# Patient Record
Sex: MALE | Race: OTHER | HISPANIC OR LATINO | Employment: FULL TIME | URBAN - METROPOLITAN AREA
[De-identification: names, ages, dates, MRNs, and addresses within clinical notes are randomized per-mention and may not be internally consistent; named-entity substitution may affect disease eponyms.]

---

## 2017-06-30 ENCOUNTER — OCCUPATIONAL MEDICINE (OUTPATIENT)
Dept: URGENT CARE | Facility: CLINIC | Age: 37
End: 2017-06-30
Payer: OTHER MISCELLANEOUS

## 2017-06-30 VITALS
OXYGEN SATURATION: 98 % | DIASTOLIC BLOOD PRESSURE: 80 MMHG | RESPIRATION RATE: 14 BRPM | TEMPERATURE: 98 F | HEART RATE: 64 BPM | SYSTOLIC BLOOD PRESSURE: 120 MMHG
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
## 2017-06-30 PROCEDURE — 90715 TDAP VACCINE 7 YRS/> IM: CPT | Performed by: FAMILY MEDICINE

## 2017-06-30 PROCEDURE — 99203 OFFICE O/P NEW LOW 30 MIN: CPT | Mod: 25 | Performed by: FAMILY MEDICINE

## 2017-06-30 PROCEDURE — 90471 IMMUNIZATION ADMIN: CPT | Performed by: FAMILY MEDICINE

## 2017-06-30 ASSESSMENT — ENCOUNTER SYMPTOMS
CARDIOVASCULAR NEGATIVE: 1
PSYCHIATRIC NEGATIVE: 1
FEVER: 0
EYES NEGATIVE: 1
CONSTITUTIONAL NEGATIVE: 1
CHILLS: 0
MUSCULOSKELETAL NEGATIVE: 1
RESPIRATORY NEGATIVE: 1
NEUROLOGICAL NEGATIVE: 1
GASTROINTESTINAL NEGATIVE: 1

## 2017-06-30 NOTE — PROGRESS NOTES
Subjective:      Rogers Cruz is a 36 y.o. male who presents with Animal Bite     HPI       DOI:  6/26     Mr. 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 He denies any fever, pain, chills, or any pain or swelling to his left lower leg or foot.  He states the dog did break his skin and there was small amount of blood at the time but nothing since.   He states he was bit by a medium sized poodle that was not a stray and is unaware if the dogs vaccines are up to date but his supervisor was investigating.    Past medical history was unremarkable and not pertinent to current issue  Social hx - denies tobacco, alcohol, drug use  Family hx was reviewed - no pertinent past family hx      Review of Systems   Constitutional: Negative.  Negative for fever, chills and malaise/fatigue.   HENT: Negative.    Eyes: Negative.    Respiratory: Negative.    Cardiovascular: Negative.  Negative for leg swelling.   Gastrointestinal: Negative.    Genitourinary: Negative.    Musculoskeletal: Negative.  Negative for joint pain.   Skin:        Small bite to left lower leg.  No bleeding, no pain, no swelling   Neurological: Negative.    Psychiatric/Behavioral: Negative.           Objective:     /80 mmHg  Pulse 64  Temp(Src) 36.7 °C (98 °F)  Resp 14  SpO2 98%     Physical Exam   Constitutional: He is oriented to person, place, and time. Vital signs are normal. He appears well-developed. He is cooperative.   Cardiovascular: Normal rate and regular rhythm.    Pulmonary/Chest: Effort normal.   Neurological: He is alert and oriented to person, place, and time.   Skin:                    Assessment/Plan:   1. Dogbite wound to back left lower leg.   Healing well  -Patient will return to full duty work today.  -TDap given.  -Will return to clinic within 7 days for follow-up.

## 2017-06-30 NOTE — Clinical Note
[address] [place] [address]  Phone: [phone] - Fax: [phone]        [facility] Progress Report and Disability Certification  Date of Service: 6/30/2017   No Show:  No  Date / Time of Next Visit:  7/6/17 @ 9:40am   Claim Information   Patient Name: Rogers Cruz  Claim Number:     Employer:  [employer]  Date of Injury: 6/26/2017     Insurer / TPA: [employer]  ID / SSN:     Occupation:   Diagnosis: The encounter diagnosis was Dog bite, initial encounter.    Medical Information   Related to Industrial Injury? 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 He denies any fever, pain, chills, or any pain or swelling to his left lower leg or foot.  He states the dog did break his skin and there was small amount of blood at the time but nothing since.   He states he was bit by a medium sized poodle that was not a stray and is unaware if the dogs vaccines are up to date but his supervisor was investigating.  He denies any fevers, chills, n/v.       LAST TETANUS - > 5 years ago   Objective Findings: Left lower leg - there is a 0.5cm, superficial healed bite wound that is clean, dry and intact.   There is no tenderness to palpation and no surrounding erythema.    Pre-Existing Condition(s):     Assessment:   Initial Visit    Status: Additional Care Required  Permanent Disability:No    Plan:      Diagnostics:      Comments:       Disability Information   Status: Released to Full Duty    From:     Through:   Restrictions are:     Physical Restrictions   Sitting:    Standing:    Stooping:    Bending:      Squatting:    Walking:    Climbing:    Pushing:      Pulling:    Other:    Reaching Above Shoulder (L):   Reaching Above Shoulder (R):       Reaching Below Shoulder (L):    Reaching Below Shoulder (R):      Not to exceed Weight  Limits   Carrying(hrs):   Weight Limit(lb):   Lifting(hrs):   Weight  Limit(lb):     Comments: Dog bite to leg  Healing well  TDaP given  Cleared for full duty  F/u 1-4 days    Repetitive Actions   Hands: i.e. Fine Manipulations from Grasping:     Feet: i.e. Operating Foot Controls:     Driving / Operate Machinery:     Physician Name: Jay Brown M.D. Physician Signature: JAY Quinteros M.D. e-Signature: Dr. Waldo Lou, Medical Director   Clinic Name / Location: [address] [facility] [place] [address] Clinic Phone Number: Dept: [phone]   Appointment Time: 10:00 Am Visit Start Time: 10:09 AM   Check-In Time:  9:59 Am Visit Discharge Time:  10:48am   Original-Treating Physician or Chiropractor    Page 2-Insurer/TPA    Page 3-Employer    Page 4-Employee

## 2017-06-30 NOTE — Clinical Note
EMPLOYEE’S CLAIM FOR COMPENSATION/ REPORT OF INITIAL TREATMENT  FORM C-4    EMPLOYEE’S CLAIM - PROVIDE ALL INFORMATION REQUESTED   First Name  Rogers Guardado Last Name  Anthony Birthdate                    1980                Sex  male Claim Number   Home Address  [address] Age  36 y.o. Height   Weight   SSN     [facility] Zip  [address] Telephone  [phone] (home)    Mailing Address  [address] [facility] Zip  [address] Primary Language Spoken  English    Insurer   Third Party   [facility]   Employee's Occupation (Job Title) When Injury or Occupational Disease Occurred      Employer's Name  [employer]  Telephone  [phone]    Employer Address  [place]   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 (Be specific an answer in detail. 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  n/a Witnesses to the Accident  n/a      Nature of Injury or Occupational Disease  Puncture  Part(s) of Body Injured or Affected  Lower Leg (L), ,     I certify that the above is true and correct to the best of my knowledge and that I have provided this information in order to obtain the benefits of [place]  inclusive or Chapter [place] of [place]).  I hereby authorize any physician, chiropractor, surgeon, practitioner, or other person, any hospital, including [facility] or [facility] hospital, any medical service organization, any insurance company, or other institution or organization to release to each other, any medical or other information, including benefits paid or payable, pertinent to this injury or disease, except information relative to diagnosis, treatment and/or counseling for AIDS, psychological conditions, alcohol or controlled substances, for which I must give specific authorization.  A Photostat of this authorization shall be as valid as the original.     Date   Place   Employee’s Signature   THIS REPORT MUST BE COMPLETED AND MAILED WITHIN 3 WORKING DAYS OF TREATMENT   Place  [facility]  Name of Facility  [facility]   Date  6/30/2017 Diagnosis  (W54.0XXA) Dog bite, initial encounter Is there evidence the injured employee was under the influence of alcohol and/or another controlled substance at the time of accident?   Hour  10:09 AM Description of Injury or Disease  The encounter diagnosis was Dog bite, initial encounter. No   Treatment  Dog bite to leg  Healing well  TDaP given  Cleared for full duty  F/u 1-4 days  Have you advised the patient to remain off work five days or more? No   X-Ray Findings      If Yes   From Date  To Date      From information given by the employee, together with medical evidence, can you directly connect this injury or occupational disease as job incurred?  Yes If No Full Duty  Yes Modified Duty      Is additional medical care by a physician indicated?  Yes If Modified Duty, Specify any Limitations / Restrictions      Do you know of any previous injury or disease contributing to this condition or occupational disease?                            No   Date  6/30/2017 Print Doctor’s Name Jay Brown M.D. I certify the employer’s copy of  this form was  "mailed on:   Address  [address] [address] Insurer’s Use Only     [facility] Zip  [address]    Provider’s Tax ID Number  [id number]  Telephone  Dept: [phone]        boogie-JORDANA Reynolds M.D.   e-Signature: Dr. Waldo Lou, Medical Director Degree  MD        ORIGINAL-TREATING PHYSICIAN OR CHIROPRACTOR    PAGE 2-INSURER/TPA    PAGE 3-EMPLOYER    PAGE 4-EMPLOYEE             Form C-4 (rev10/07)              BRIEF DESCRIPTION OF RIGHTS AND BENEFITS  (Pursuant to [place] 616C.050)    Notice of Injury or Occupational Disease (Incident Report Form C-1): If an injury or occupational disease (OD) arises out of and in the  course of employment, you must provide written notice to your employer as soon as practicable, but no later than 7 days after the accident or  OD. Your employer shall maintain a sufficient supply of the required forms.    Claim for Compensation (Form C-4): If medical treatment is sought, the form C-4 is available at the place of initial treatment. A completed  \"Claim for Compensation\" (Form C-4) must be filed within 90 days after an accident or OD. The treating physician or chiropractor must,  within 3 working days after treatment, complete and mail to the employer, the employer's insurer and third-party , the Claim for  Compensation.    Medical Treatment: If you require medical treatment for your on-the-job injury or OD, you may be required to select a physician or  chiropractor from a list provided by your workers’ compensation insurer, if it has contracted with an Organization for Managed Care (MCO) or  Preferred Provider Organization (PPO) or providers of health care. If your employer has not entered into a contract with an MCO or PPO, you  may select a physician or chiropractor from the Panel of Physicians and Chiropractors. Any medical costs related to your industrial injury or  OD will be paid by your insurer.    Temporary Total Disability (TTD): If your doctor has " certified that you are unable to work for a period of at least 5 consecutive days, or 5  cumulative days in a 20-day period, or places restrictions on you that your employer does not accommodate, you may be entitled to TTD  compensation.    Temporary Partial Disability (TPD): If the wage you receive upon reemployment is less than the compensation for TTD to which you are  entitled, the insurer may be required to pay you TPD compensation to make up the difference. TPD can only be paid for a maximum of 24  months.    Permanent Partial Disability (PPD): When your medical condition is stable and there is an indication of a PPD as a result of your injury or  OD, within 30 days, your insurer must arrange for an evaluation by a rating physician or chiropractor to determine the degree of your PPD. The  amount of your PPD award depends on the date of injury, the results of the PPD evaluation and your age and wage.    Permanent Total Disability (PTD): If you are medically certified by a treating physician or chiropractor as permanently and totally disabled  and have been granted a PTD status by your insurer, you are entitled to receive monthly benefits not to exceed 66 2/3% of your average  monthly wage. The amount of your PTD payments is subject to reduction if you previously received a PPD award.    Vocational Rehabilitation Services: You may be eligible for vocational rehabilitation services if you are unable to return to the job due to a  permanent physical impairment or permanent restrictions as a result of your injury or occupational disease.    Transportation and Per Domenica Reimbursement: You may be eligible for travel expenses and per domenica associated with medical treatment.    Reopening: You may be able to reopen your claim if your condition worsens after claim closure.    Appeal Process: If you disagree with a written determination issued by the insurer or the insurer does not respond to your request, you may  appeal  to the Department of Administration, , by following the instructions contained in your determination letter. You must  appeal the determination within 70 days from the date of the determination letter at [address], [address], [place], [place]  [address], or [address], [address], [place], [place] [address]. If you disagree with the  decision, you may appeal to the  Department of Administration, . You must file your appeal within 30 days from the date of the  decision  letter at [address], [address], [place], [place] [address], or [address], [address], [place], [place] [address]. If you  disagree with a decision of an , you may file a petition for judicial review with the District Court. You must do so within 30  days of the Appeal Officer’s decision. You may be represented by an  at your own expense or you may contact the [facility] for possible  representation.    [place]  for Injured Workers (NAIW): If you disagree with a  decision, you may request that NAIW represent you  without charge at an  Hearing. For information regarding denial of benefits, you may contact the [facility] at: [address], [address], [place] [address], [phone], or [address], [place] [address], [phone]    To File a Complaint with the Division: If you wish to file a complaint with the  of the Division of Industrial Relations (DIR),  please contact the Workers’ Compensation Section, [address], [address], [place], [place] [address], telephone [phone]) [phone], or  [address], [address], [place] [address], telephone [phone]) [phone].    For assistance with Workers’ Compensation Issues: you may contact the Office of the Governor Consumer Health Assistance, [address], [address], [place],  [place] [address], Toll Free 1-823.640.2412, Web site: http://parker.[facility].[place]., E-mail  Nathalie@[facility].nv.                                                                                                                                                                                                                                   __________________________________________________________________                                                                   _________________                Employee Name / Signature                                                                                                                                                       Date                                                                                                                                                                                                     D-2 (rev. 10/07)

## 2017-06-30 NOTE — MR AVS SNAPSHOT
Rogers Cruz   2017 10:00 AM   Occupational Medicine   MRN: [id number]    Department:  [facility]   Dept Phone:  [phone]    Description:  Male : 1980   Provider:  Jay Brown M.D.           Reason for Visit     Animal Bite NEW WC left lower leg bite X 5 days       Allergies as of 2017     No Known Allergies      You were diagnosed with     Dog bite, initial encounter   [[id number]]         Vital Signs     Blood Pressure Pulse Temperature Respirations Oxygen Saturation       120/80 mmHg 64 36.7 °C (98 °F) 14 98%       Basic Information     Date Of Birth Sex Race Ethnicity Preferred Language    1980 Male  or  Unknown English      Your appointments     2017  9:40 AM   Workers Compensation ([place]) with Gilbert Mclain D.O.   [address] [address]   [phone]              Health Maintenance        Date Due Completion Dates    IMM DTaP/Tdap/Td Vaccine (2 - Td) 2020            Current Immunizations     Tdap Vaccine 2017, 2010      Below and/or attached are the medications your provider expects you to take. Review all of your home medications and newly ordered medications with your provider and/or pharmacist. Follow medication instructions as directed by your provider and/or pharmacist. Please keep your medication list with you and share with your provider. Update the information when medications are discontinued, doses are changed, or new medications (including over-the-counter products) are added; and carry medication information at all times in the event of emergency situations     Allergies:  No Known Allergies          Medications  Valid as of: 2017 - 10:53 AM    Generic Name Brand Name Tablet Size Instructions for use    .                 Medicines prescribed today were sent to:     [id number] - [place] - [address]    [address] [place] [address]    Phone: [phone] Fax: [phone]    Open 24 Hours?: No      Medication refill instructions:       If your prescription bottle indicates you have medication refills left, it is not necessary to call your provider’s office. Please contact your pharmacy and they will refill your medication.    If your prescription bottle indicates you do not have any refills left, you may request refills at any time through one of the following ways: The online [employer] system (except Urgent Care), by calling your provider’s office, or by asking your pharmacy to contact your provider’s office with a refill request. Medication refills are processed only during regular business hours and may not be available until the next business day. Your provider may request additional information or to have a follow-up visit with you prior to refilling your medication.   *Please Note: Medication refills are assigned a new Rx number when refilled electronically. Your pharmacy may indicate that no refills were authorized even though a new prescription for the same medication is available at the pharmacy. Please request the medicine by name with the pharmacy before contacting your provider for a refill.           "[employer]hart Status: Patient Declined

## 2017-07-06 ENCOUNTER — OCCUPATIONAL MEDICINE (OUTPATIENT)
Dept: OCCUPATIONAL MEDICINE | Facility: CLINIC | Age: 37
End: 2017-07-06
Payer: OTHER MISCELLANEOUS

## 2017-07-06 VITALS
HEART RATE: 80 BPM | HEIGHT: 69 IN | TEMPERATURE: 98.1 F | RESPIRATION RATE: 14 BRPM | WEIGHT: 200 LBS | BODY MASS INDEX: 29.62 KG/M2 | SYSTOLIC BLOOD PRESSURE: 120 MMHG | DIASTOLIC BLOOD PRESSURE: 72 MMHG | OXYGEN SATURATION: 99 %
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
## 2017-07-06 PROCEDURE — 99213 OFFICE O/P EST LOW 20 MIN: CPT | Performed by: PREVENTIVE MEDICINE

## 2017-07-06 ASSESSMENT — ENCOUNTER SYMPTOMS
DOUBLE VISION: 0
TINGLING: 0
NAUSEA: 0
BACK PAIN: 0
SHORTNESS OF BREATH: 0
FEVER: 0
VOMITING: 0
SENSORY CHANGE: 0
BLURRED VISION: 0
CHILLS: 0
COUGH: 0

## 2017-07-06 NOTE — Clinical Note
[address],   [address] [place] [address]  Phone: [phone] - Fax: [phone]        [facility] Progress Report and Disability Certification  Date of Service: 7/6/2017   No Show:  No  Date / Time of Next Visit:  Release from care   Claim Information   Patient Name: Rogers Cruz  Claim Number:     Employer:  [employer]  Date of Injury: 6/26/2017     Insurer / TPA: [facility] [employer]  ID / SSN:     Occupation:   Diagnosis: The encounter diagnosis was Dog bite, initial encounter.    Medical Information   Related to Industrial Injury? Yes    Subjective Complaints:  DOI 6/26/17: Mr. 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 Seen in UC x1, no indication for antibiotics. Patient states that wound is completely healed. No pain, swelling or open wound. Has been working full duty without difficulty.   Objective Findings: Left Lower Leg: Well-healed puncture wound just lateral to the Achilles tendon. No erythema, swelling or drainage. Nontender.   Pre-Existing Condition(s):     Assessment:   Condition Improved    Status: Discharged /  MMI  Permanent Disability:No    Plan:      Diagnostics:      Comments:  Release from care  Full Duty  Follow up as needed    Disability Information   Status: Released to Full Duty    From:  7/6/2017  Through:   Restrictions are:     Physical Restrictions   Sitting:    Standing:    Stooping:    Bending:      Squatting:    Walking:    Climbing:    Pushing:      Pulling:    Other:    Reaching Above Shoulder (L):   Reaching Above Shoulder (R):       Reaching Below Shoulder (L):    Reaching Below Shoulder (R):      Not to exceed Weight Limits   Carrying(hrs):   Weight Limit(lb):   Lifting(hrs):   Weight  Limit(lb):     Comments:      Repetitive Actions   Hands: i.e. Fine Manipulations from Grasping:     Feet: i.e. Operating Foot Controls:        Driving / Operate Machinery:     Physician Name: Gilbert Mclain D.O. Physician Signature: GILBERT Weaver D.O. e-Signature: Dr. Waldo Lou, Medical Director   Clinic Name / Location: [address],   [address]  [place] [address] Clinic Phone Number: Dept: [phone]   Appointment Time: 9:40 Am Visit Start Time: 10:02 AM   Check-In Time:  9:45 Am Visit Discharge Time: 10:13 AM   Original-Treating Physician or Chiropractor    Page 2-Insurer/TPA    Page 3-Employer    Page 4-Employee

## 2017-07-06 NOTE — MR AVS SNAPSHOT
"        Rogers Cruz   2017 9:40 AM   Occupational Medicine   MRN: [id number]    Department:  [facility]   Dept Phone:  [phone]    Description:  Male : 1980   Provider:  Gilbert Mclain D.O.           Reason for Visit     Other WC FV new2u DOI 17 dog bite (L) lower leg, better, room 3      Allergies as of 2017     No Known Allergies      You were diagnosed with     Dog bite, initial encounter   [[id number]]         Vital Signs     Blood Pressure Pulse Temperature Respirations Height Weight    120/72 mmHg 80 36.7 °C (98.1 °F) 14 1.753 m (5' 9.02\") 90.719 kg (200 lb)    Body Mass Index Oxygen Saturation Smoking Status             29.52 kg/m2 99% Never Smoker          Basic Information     Date Of Birth Sex Race Ethnicity Preferred Language    1980 Male  or  Unknown English      Health Maintenance        Date Due Completion Dates    IMM INFLUENZA (1) 2017 ---    IMM DTaP/Tdap/Td Vaccine (3 - Td) 2027, 2010            Current Immunizations     Tdap Vaccine 2017, 2010      Below and/or attached are the medications your provider expects you to take. Review all of your home medications and newly ordered medications with your provider and/or pharmacist. Follow medication instructions as directed by your provider and/or pharmacist. Please keep your medication list with you and share with your provider. Update the information when medications are discontinued, doses are changed, or new medications (including over-the-counter products) are added; and carry medication information at all times in the event of emergency situations     Allergies:  No Known Allergies          Medications  Valid as of: 2017 - 10:11 AM    Generic Name Brand Name Tablet Size Instructions for use    .                 Medicines prescribed today were sent to:     [facility], [place] - [address] [facility] [address] " [place] [address]    Phone: [phone] Fax: [phone]    Open 24 Hours?: No      Medication refill instructions:       If your prescription bottle indicates you have medication refills left, it is not necessary to call your provider’s office. Please contact your pharmacy and they will refill your medication.    If your prescription bottle indicates you do not have any refills left, you may request refills at any time through one of the following ways: The online [employer] system (except Urgent Care), by calling your provider’s office, or by asking your pharmacy to contact your provider’s office with a refill request. Medication refills are processed only during regular business hours and may not be available until the next business day. Your provider may request additional information or to have a follow-up visit with you prior to refilling your medication.   *Please Note: Medication refills are assigned a new Rx number when refilled electronically. Your pharmacy may indicate that no refills were authorized even though a new prescription for the same medication is available at the pharmacy. Please request the medicine by name with the pharmacy before contacting your provider for a refill.           [facility] Status: Patient Declined

## 2017-07-06 NOTE — PROGRESS NOTES
"Subjective:      Rogers Cruz is a 36 y.o. male who presents with Other      DOI 6/26/17: Mr. 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 Seen in UC x1, no indication for antibiotics. Patient states that wound is completely healed. No pain, swelling or open wound. Has been working full duty without difficulty.     Other  Pertinent negatives include no chills, coughing, fever, nausea, rash or vomiting.       Review of Systems   Constitutional: Negative for fever and chills.   Eyes: Negative for blurred vision and double vision.   Respiratory: Negative for cough and shortness of breath.    Gastrointestinal: Negative for nausea and vomiting.   Musculoskeletal: Negative for back pain and joint pain.   Skin: Negative for itching and rash.   Neurological: Negative for tingling and sensory change.       PMH: No pertinent past medical history to this problem  MEDS: Medications were reviewed in Epic  ALLERGIES: No Known Allergies  SOCHX: Works as  at [employer]   FH: No pertinent family history to this problem       Objective:     /72 mmHg  Pulse 80  Temp(Src) 36.7 °C (98.1 °F)  Resp 14  Ht 1.753 m (5' 9.02\")  Wt 90.719 kg (200 lb)  BMI 29.52 kg/m2  SpO2 99%     Physical Exam   Constitutional: He is oriented to person, place, and time. He appears well-developed and well-nourished.   Cardiovascular: Normal rate.    Pulmonary/Chest: Effort normal. No respiratory distress.   Neurological: He is alert and oriented to person, place, and time.   Skin: Skin is warm and dry.   Psychiatric: He has a normal mood and affect. Judgment normal.       Left Lower Leg: Well-healed puncture wound just lateral to the Achilles tendon. No erythema, swelling or drainage. Nontender.       Assessment/Plan:     1. Dog bite, initial encounter  Release from care  Full Duty  Follow up as needed        "

## 2018-06-29 ENCOUNTER — HOSPITAL ENCOUNTER (OUTPATIENT)
Dept: RADIOLOGY | Facility: MEDICAL CENTER | Age: 38
End: 2018-06-29
Attending: FAMILY MEDICINE
Payer: COMMERCIAL

## 2018-06-29 DIAGNOSIS — M79.605 PAIN OF LEFT LOWER EXTREMITY: ICD-10-CM

## 2018-06-29 DIAGNOSIS — M25.562 ACUTE PAIN OF LEFT KNEE: ICD-10-CM

## 2018-06-29 PROCEDURE — 73564 X-RAY EXAM KNEE 4 OR MORE: CPT | Mod: LT

## 2018-06-29 PROCEDURE — 73610 X-RAY EXAM OF ANKLE: CPT | Mod: LT

## 2022-01-14 ENCOUNTER — HOSPITAL ENCOUNTER (OUTPATIENT)
Dept: LAB | Facility: MEDICAL CENTER | Age: 42
End: 2022-01-14
Attending: STUDENT IN AN ORGANIZED HEALTH CARE EDUCATION/TRAINING PROGRAM
Payer: COMMERCIAL

## 2022-01-14 LAB
25(OH)D3 SERPL-MCNC: 27 NG/ML (ref 30–100)
ALBUMIN SERPL BCP-MCNC: 4.5 G/DL (ref 3.2–4.9)
ALBUMIN/GLOB SERPL: 1.7 G/DL
ALP SERPL-CCNC: 74 U/L (ref 30–99)
ALT SERPL-CCNC: 25 U/L (ref 2–50)
ANION GAP SERPL CALC-SCNC: 9 MMOL/L (ref 7–16)
AST SERPL-CCNC: 28 U/L (ref 12–45)
BASOPHILS # BLD AUTO: 1 % (ref 0–1.8)
BASOPHILS # BLD: 0.04 K/UL (ref 0–0.12)
BILIRUB SERPL-MCNC: 0.6 MG/DL (ref 0.1–1.5)
BUN SERPL-MCNC: 15 MG/DL (ref 8–22)
CALCIUM SERPL-MCNC: 9.2 MG/DL (ref 8.5–10.5)
CHLORIDE SERPL-SCNC: 107 MMOL/L (ref 96–112)
CHOLEST SERPL-MCNC: 153 MG/DL (ref 100–199)
CO2 SERPL-SCNC: 25 MMOL/L (ref 20–33)
CREAT SERPL-MCNC: 0.85 MG/DL (ref 0.5–1.4)
EOSINOPHIL # BLD AUTO: 0.23 K/UL (ref 0–0.51)
EOSINOPHIL NFR BLD: 5.5 % (ref 0–6.9)
ERYTHROCYTE [DISTWIDTH] IN BLOOD BY AUTOMATED COUNT: 40.4 FL (ref 35.9–50)
EST. AVERAGE GLUCOSE BLD GHB EST-MCNC: 103 MG/DL
FASTING STATUS PATIENT QL REPORTED: NORMAL
GLOBULIN SER CALC-MCNC: 2.6 G/DL (ref 1.9–3.5)
GLUCOSE SERPL-MCNC: 100 MG/DL (ref 65–99)
HBA1C MFR BLD: 5.2 % (ref 4–5.6)
HCT VFR BLD AUTO: 47.8 % (ref 42–52)
HDLC SERPL-MCNC: 46 MG/DL
HGB BLD-MCNC: 16.2 G/DL (ref 14–18)
IMM GRANULOCYTES # BLD AUTO: 0 K/UL (ref 0–0.11)
IMM GRANULOCYTES NFR BLD AUTO: 0 % (ref 0–0.9)
LDLC SERPL CALC-MCNC: 90 MG/DL
LYMPHOCYTES # BLD AUTO: 1.82 K/UL (ref 1–4.8)
LYMPHOCYTES NFR BLD: 43.3 % (ref 22–41)
MCH RBC QN AUTO: 31.5 PG (ref 27–33)
MCHC RBC AUTO-ENTMCNC: 33.9 G/DL (ref 33.7–35.3)
MCV RBC AUTO: 92.8 FL (ref 81.4–97.8)
MONOCYTES # BLD AUTO: 0.36 K/UL (ref 0–0.85)
MONOCYTES NFR BLD AUTO: 8.6 % (ref 0–13.4)
NEUTROPHILS # BLD AUTO: 1.75 K/UL (ref 1.82–7.42)
NEUTROPHILS NFR BLD: 41.6 % (ref 44–72)
NRBC # BLD AUTO: 0 K/UL
NRBC BLD-RTO: 0 /100 WBC
PLATELET # BLD AUTO: 217 K/UL (ref 164–446)
PMV BLD AUTO: 9.3 FL (ref 9–12.9)
POTASSIUM SERPL-SCNC: 4.5 MMOL/L (ref 3.6–5.5)
PROT SERPL-MCNC: 7.1 G/DL (ref 6–8.2)
RBC # BLD AUTO: 5.15 M/UL (ref 4.7–6.1)
SODIUM SERPL-SCNC: 141 MMOL/L (ref 135–145)
T3FREE SERPL-MCNC: 3.82 PG/ML (ref 2–4.4)
T4 FREE SERPL-MCNC: 1.51 NG/DL (ref 0.93–1.7)
TRIGL SERPL-MCNC: 87 MG/DL (ref 0–149)
TSH SERPL DL<=0.005 MIU/L-ACNC: 1.17 UIU/ML (ref 0.38–5.33)
WBC # BLD AUTO: 4.2 K/UL (ref 4.8–10.8)

## 2022-01-14 PROCEDURE — 85025 COMPLETE CBC W/AUTO DIFF WBC: CPT

## 2022-01-14 PROCEDURE — 84439 ASSAY OF FREE THYROXINE: CPT

## 2022-01-14 PROCEDURE — 80061 LIPID PANEL: CPT

## 2022-01-14 PROCEDURE — 80053 COMPREHEN METABOLIC PANEL: CPT

## 2022-01-14 PROCEDURE — 84443 ASSAY THYROID STIM HORMONE: CPT

## 2022-01-14 PROCEDURE — 82306 VITAMIN D 25 HYDROXY: CPT

## 2022-01-14 PROCEDURE — 36415 COLL VENOUS BLD VENIPUNCTURE: CPT

## 2022-01-14 PROCEDURE — 83036 HEMOGLOBIN GLYCOSYLATED A1C: CPT

## 2022-01-14 PROCEDURE — 84481 FREE ASSAY (FT-3): CPT

## 2023-04-24 ENCOUNTER — OCCUPATIONAL MEDICINE (OUTPATIENT)
Dept: URGENT CARE | Facility: PHYSICIAN GROUP | Age: 43
End: 2023-04-24
Payer: OTHER MISCELLANEOUS

## 2023-04-24 VITALS
HEART RATE: 55 BPM | SYSTOLIC BLOOD PRESSURE: 112 MMHG | BODY MASS INDEX: 29.7 KG/M2 | DIASTOLIC BLOOD PRESSURE: 64 MMHG | HEIGHT: 68 IN | WEIGHT: 196 LBS | OXYGEN SATURATION: 99 % | TEMPERATURE: 97.2 F | RESPIRATION RATE: 18 BRPM
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
## 2023-04-24 DIAGNOSIS — S39.012A REPETITIVE STRAIN INJURY OF LOWER BACK, INITIAL ENCOUNTER: ICD-10-CM

## 2023-04-24 PROCEDURE — 99203 OFFICE O/P NEW LOW 30 MIN: CPT | Performed by: NURSE PRACTITIONER

## 2023-04-24 RX ORDER — CYCLOBENZAPRINE HCL 5 MG
5 TABLET ORAL
Qty: 20 TABLET | Refills: 0 | Status: SHIPPED | OUTPATIENT
Start: 2023-04-24 | End: 2023-05-04

## 2023-04-24 RX ORDER — PREDNISONE 20 MG/1
40 TABLET ORAL DAILY
Qty: 14 TABLET | Refills: 0 | Status: SHIPPED | OUTPATIENT
Start: 2023-04-24 | End: 2023-05-01

## 2023-04-24 ASSESSMENT — ENCOUNTER SYMPTOMS: BACK PAIN: 1

## 2023-04-24 ASSESSMENT — FIBROSIS 4 INDEX: FIB4 SCORE: 1.11

## 2023-04-24 NOTE — PROGRESS NOTES
"Subjective:     Rogers Cruz is a 42 y.o. male who presents for Back Pain (New  lower back pain)      Back Pain    DOI 12/15/2022: Pt presents for evaluation of a new comp injury. Rogers is a pleasant 42 year old male who works as a . 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 He first noticed low back pain 4 months ago that has progressively become more frequent and is worsening intensity. His pain does not radiate. He does use NSAIDS, Tylenol and ice for his discomfort for his pain. His pain ranges from a 4/10-9/10. His pain does worsen with activity. Negative for paraesthesia, abdominal pain or changes in bowel or bladder. No other employers.   Review of Systems   Musculoskeletal:  Positive for back pain.     PMH: History reviewed. No pertinent past medical history.  ALLERGIES: No Known Allergies  SURGHX: History reviewed. No pertinent surgical history.  SOCHX:   Social History     Socioeconomic History    Marital status: Single   Tobacco Use    Smoking status: Some Days     Types: Cigarettes   Vaping Use    Vaping Use: Never used   Substance and Sexual Activity    Alcohol use: Yes     Comment: occ    Drug use: Never     FH: History reviewed. No pertinent family history.      Objective:   /64   Pulse (!) 55   Temp 36.2 °C (97.2 °F)   Resp 18   Ht 1.727 m (5' 8\")   Wt 88.9 kg (196 lb)   SpO2 99%   BMI 29.80 kg/m²     Physical Exam  Vitals and nursing note reviewed.   Constitutional:       General: He is not in acute distress.     Appearance: Normal appearance. He is not ill-appearing.   HENT:      Head: Normocephalic and atraumatic.      Right Ear: External ear normal.      Left Ear: External ear normal.      Nose: No congestion or rhinorrhea.      Mouth/Throat:      Mouth: Mucous membranes are moist.   Eyes:      Extraocular Movements: Extraocular movements intact.      Pupils: Pupils are equal, round, and reactive " to light.   Cardiovascular:      Rate and Rhythm: Normal rate and regular rhythm.      Pulses: Normal pulses.      Heart sounds: Normal heart sounds.   Pulmonary:      Effort: Pulmonary effort is normal.      Breath sounds: Normal breath sounds.   Abdominal:      General: Abdomen is flat. Bowel sounds are normal.      Palpations: Abdomen is soft.      Tenderness: There is no abdominal tenderness. There is no right CVA tenderness or left CVA tenderness.   Musculoskeletal:      Cervical back: Normal range of motion and neck supple.      Lumbar back: Spasms, tenderness and bony tenderness present. No swelling, edema, deformity, signs of trauma or lacerations. Decreased range of motion. Positive left straight leg raise test. Negative right straight leg raise test. No scoliosis.        Back:       Comments: Strength bilateral lower extremities 5/5   Skin:     General: Skin is warm and dry.      Capillary Refill: Capillary refill takes less than 2 seconds.   Neurological:      General: No focal deficit present.      Mental Status: He is alert and oriented to person, place, and time. Mental status is at baseline.   Psychiatric:         Mood and Affect: Mood normal.         Behavior: Behavior normal.         Thought Content: Thought content normal.         Judgment: Judgment normal.   Pt     Assessment/Plan:   Assessment    1. Repetitive strain injury of lower back, initial encounter  Referral to Physical Therapy    predniSONE (DELTASONE) 20 MG Tab      I encouraged pt to use supportive measures including rest, massage and performing stretches. Apply heat and ice for additional relief. Prednisone sent to pharmacy for relief of inflammation. Acetaminophen may be used every 4 hours as needed for additional relief of pain. Pt educated not to exceed more than advised amount on bottle. Muscle relaxer sent to pharmacy for relief of back spasms. We discussed sedative effect of this medication and pt was advised to use only before  bed.  Pt verbalizes understanding.  Follow up in clinic for worsening or persistent symptoms.

## 2023-04-24 NOTE — LETTER
"EMPLOYEE’S CLAIM FOR COMPENSATION/ REPORT OF INITIAL TREATMENT  FORM C-4  PLEASE TYPE OR PRINT    EMPLOYEE’S CLAIM - PROVIDE ALL INFORMATION REQUESTED   First Name  Rogers Guardado Last Name  Anthony Birthdate                    1980                Sex  male Claim Number (Insurer’s Use Only)   Home Address  [address] Age  42 y.o. 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        Date of Injury  12/15/2022               Hours Injury  10:00 AM Date Employer Notified  3/11/2023 Last Day of Work after Injury or Occupational Disease  4/22/2023 Supervisor to Whom Injury     Reported  Yane   Address or Location of Accident (if applicable)  Work [1]   What were you doing at the time of accident? (if applicable)  Working - (pulling - pushing - bending - twisting)    How did this injury or occupational disease occur? (Be specific and answer in detail. Use additional sheet if necessary)  These injury has been going on for the last 5 months while I'm working due to repeatedly izdmdiu-voqrzss-mvvzwgp-twisting-carrying. Dismount my work vehicle.   If you believe that you have an occupational disease, when did you first have knowledge of the disability and its relationship to your employment?   Witnesses to the Accident (if applicable)  None      Nature of Injury or Occupational Disease  Workers' Compensation  Part(s) of Body Injured or Affected  Lower Back Area (Lumbar Area & " Lumbo-Sacral), ,     I CERTIFY THAT THE ABOVE IS TRUE AND CORRECT TO T HE BEST OF MY KNOWLEDGE AND THAT I HAVE PROVIDED THIS INFORMATION IN ORDER TO OBTAIN THE BENEFITS OF [place], OR CHAPTER 617 [place]).  I HEREBY AUTHORIZE ANY PHYSICIAN, CHIROPRACTOR, SURGEON, PRACTITIONER OR ANY OTHER PERSON, ANY HOSPITAL, INCLUDING [facility] OR [facility], ANY  MEDICAL SERVICE ORGANIZATION, ANY INSURANCE COMPANY, OR OTHER INSTITUTION OR ORGANIZATION TO RELEASE TO EACH OTHER, ANY MEDICAL OR OTHER INFORMATION, INCLUDING BENEFITS PAID OR PAYABLE, PERTINENT TO THIS INJURY OR DISEASE, EXCEPT INFORMATION RELATIVE TO DIAGNOSIS, TREATMENT AND/OR COUNSELING FOR AIDS, PSYCHOLOGICAL CONDITIONS, ALCOHOL OR CONTROLLED SUBSTANCES, FOR WHICH I MUST GIVE SPECIFIC AUTHORIZATION.  A PHOTOSTAT OF THIS AUTHORIZATION SHALL BE VALID AS THE ORIGINAL.       Date   Place Employee’s Original or  *Electronic Signature   THIS REPORT MUST BE COMPLETED AND MAILED WITHIN 3 WORKING DAYS OF TREATMENT   Place  [facility]  Name of Facility  [facility]   Date  4/24/2023 Diagnosis and Description of Injury or Occupational Disease  (S39.012A,  X50.3XXA) Repetitive strain injury of lower back, initial encounter Is there evidence that the injured employee was under the influence of alcohol and/or another controlled substance at the time of accident?  ? No ? Yes (if yes, please explain)   Hour  9:13 AM   The encounter diagnosis was Repetitive strain injury of lower back, initial encounter. No   Treatment  PT mediction  Have you advised the patient to remain off work five days or     more?    X-Ray Findings      ? Yes Indicate dates:   From   To      From information given by the employee, together with medical evidence, can        you directly connect this injury or occupational disease as job incurred?  Yes ? No If no, is the injured employee capable of:   "? full duty  Yes ? modified duty      Is additional medical care by a physician indicated?  Yes If modified duty, specify any limitations / restrictions      Do you know of any previous injury or disease contributing to this condition or occupational disease?  ? Yes ? No (Explain if yes)                          No   Date  4/24/2023 Print Health Care Provider's  Name  PABLO Ding I certify that the employer’s copy of  this form was delivered to the employer on:   Address  [address] Insurer’s Use Only     [facility] Zip  [address]    Provider’s Tax ID Number  [id number] Telephone  Dept: [phone]             Health Care Provider’s Original or Electronic Signature  e-ABIMAEL Collins Degree (MD,DO, DC,PAJeseniaC,APRN)  APRN      * Complete and attach Release of Information (Form C-4A) when injured employee signs C-4 Form electronically  ORIGINAL - TREATING HEALTHCARE PROVIDER PAGE 2 - INSURER/TPA PAGE 3 - EMPLOYER PAGE 4 - EMPLOYEE             Form C-4 (rev.08/21)           BRIEF DESCRIPTION OF RIGHTS AND BENEFITS  (Pursuant to [place] 616C.050)    Notice of Injury or Occupational Disease (Incident Report Form C-1): If an injury or occupational disease (OD) arises out of and in the course of employment, you must provide written notice to your employer as soon as practicable, but no later than 7 days after the accident or OD. Your employer shall maintain a sufficient supply of the required forms.    Claim for Compensation (Form C-4): If medical treatment is sought, the form C-4 is available at the place of initial treatment. A completed \"Claim for Compensation\" (Form C-4) must be filed within 90 days after an accident or OD. The treating physician or chiropractor must, within 3 working days after treatment, complete and mail to the employer, the employer's insurer and third-party , the Claim for Compensation.    Medical Treatment: If you require medical treatment for " your on-the-job injury or OD, you may be required to select a physician or chiropractor from a list provided by your workers’ compensation insurer, if it has contracted with an Organization for Managed Care (MCO) or Preferred Provider Organization (PPO) or providers of health care. If your employer has not entered into a contract with an MCO or PPO, you may select a physician or chiropractor from the Panel of Physicians and Chiropractors. Any medical costs related to your industrial injury or OD will be paid by your insurer.    Temporary Total Disability (TTD): If your doctor has certified that you are unable to work for a period of at least 5 consecutive days, or 5 cumulative days in a 20-day period, or places restrictions on you that your employer does not accommodate, you may be entitled to TTD compensation.    Temporary Partial Disability (TPD): If the wage you receive upon reemployment is less than the compensation for TTD to which you are entitled, the insurer may be required to pay you TPD compensation to make up the difference. TPD can only be paid for a maximum of 24 months.    Permanent Partial Disability (PPD): When your medical condition is stable and there is an indication of a PPD as a result of your injury or OD, within 30 days, your insurer must arrange for an evaluation by a rating physician or chiropractor to determine the degree of your PPD. The amount of your PPD award depends on the date of injury, the results of the PPD evaluation, your age and wage.    Permanent Total Disability (PTD): If you are medically certified by a treating physician or chiropractor as permanently and totally disabled and have been granted a PTD status by your insurer, you are entitled to receive monthly benefits not to exceed 66 2/3% of your average monthly wage. The amount of your PTD payments is subject to reduction if you previously received a lump-sum PPD award.    Vocational Rehabilitation Services: You may be  eligible for vocational rehabilitation services if you are unable to return to the job due to a permanent physical impairment or permanent restrictions as a result of your injury or occupational disease.    Transportation and Per Domenica Reimbursement: You may be eligible for travel expenses and per domenica associated with medical treatment.    Reopening: You may be able to reopen your claim if your condition worsens after claim closure.     Appeal Process: If you disagree with a written determination issued by the insurer or the insurer does not respond to your request, you may appeal to the Department of Administration, , by following the instructions contained in your determination letter. You must appeal the determination within 70 days from the date of the determination letter at [address], [address], [place], [place] [address], or [address], [place], [place] [address]. If you disagree with the  decision, you may appeal to the Department of Administration, . You must file your appeal within 30 days from the date of the  decision letter at [address], [address], [place], [place] [address], or [address], [address], [place], [place] [address]. If you disagree with a decision of an , you may file a petition for judicial review with the District Court. You must do so within 30 days of the Appeal Officer’s decision. You may be represented by an  at your own expense or you may contact the [facility] for possible representation.    [place]  for Injured Workers (NAIW): If you disagree with a  decision, you may request that NAIW represent you without charge at an  Hearing. For information regarding denial of benefits, you may contact the [facility] at: [address], [address], [place] [address], [phone], or [address], [facility]  [place] [address], [phone]    To File a Complaint with the Division: If you wish to file a complaint with the  of the Division of Industrial Relations (DIR),  please contact the Workers’ Compensation Section, [address], [address], [place], [place] [address], telephone ([phone]) [phone], or [address], [address], [place], [place] [address], telephone [phone]) [phone].    For assistance with Workers’ Compensation Issues: You may contact the [facility] Office for Consumer Health Assistance, [address], [address], [place], [place] [address], Toll Free 1-533.979.1274, Web site: http://[facility].[place].gov/Programs/SARWAT E-mail: sarwat@[facility].nv.[facility]              __________________________________________________________________                                    _________________            Employee Name / Signature                                                                                                                            Date                                                                                                                                                                                                                              D-2 (rev. 10/20)

## 2023-04-24 NOTE — LETTER
[facility]  [address]  [place] [address]  Phone:  [phone] - Fax:  [phone]   [facility] Progress Report and Disability Certification  Date of Service: 4/24/2023   No Show:  No  Date / Time of Next Visit:     Claim Information   Patient Name: Rogers Cruz  Claim Number:     Employer: [employer]  Date of Injury: 12/15/2022     Insurer / TPA: [facility] [employer]  ID / SSN:     Occupation:   Diagnosis: The encounter diagnosis was Repetitive strain injury of lower back, initial encounter.    Medical Information   Related to Industrial Injury? Yes    Subjective Complaints:  DOI 12/15/2022: Pt presents for evaluation of a new comp injury. Rogers is a pleasant 42 year old male who works as a . 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 He first noticed low back pain 4 months ago that has progressively become more frequent and is worsening intensity. His pain does not radiate. He does use NSAIDS, Tylenol and ice for his discomfort for his pain. His pain ranges from a 4/10-9/10. His pain does worsen with activity. Negative for paraesthesia, abdominal pain or changes in bowel or bladder. No other employers.    Objective Findings:  Lumbar back: Spasms, tenderness and bony tenderness present. No swelling, edema, deformity, signs of trauma or lacerations. Decreased range of motion. Positive left straight leg raise test. Negative right straight leg raise test. No scoliosis. Comments: Strength bilateral lower extremities 5/5        Pre-Existing Condition(s): none   Assessment:   Initial Visit    Status: Additional Care Required  Permanent Disability:No    Plan: MedicationMedication (NOT at Work)PT    Diagnostics:      Comments:       Disability Information   Status: Released to Full Duty    From:     Through:   Restrictions are:     Physical Restrictions   Sitting:     Standing:    Stooping:    Bending:      Squatting:    Walking:    Climbing:    Pushing:      Pulling:    Other:    Reaching Above Shoulder (L):   Reaching Above Shoulder (R):       Reaching Below Shoulder (L):    Reaching Below Shoulder (R):      Not to exceed Weight Limits   Carrying(hrs):   Weight Limit(lb):   Lifting(hrs):   Weight  Limit(lb):     Comments: Follow up in UC in 7 days or sooner for worsening symptoms.     Repetitive Actions   Hands: i.e. Fine Manipulations from Grasping:     Feet: i.e. Operating Foot Controls:     Driving / Operate Machinery:     Health Care Provider’s Original or Electronic Signature  PABLO Ding Health Care Provider’s Original or Electronic Signature    Gilbert Mclain DO MPH     Clinic Name / Location: [address] [address] Clinic Phone Number: Dept: [phone]   Appointment Time: 8:35 Am Visit Start Time: 9:13 AM   Check-In Time:  9:13 Am Visit Discharge Time:  10:15am   Original-Treating Physician or Chiropractor    Page 2-Insurer/TPA    Page 3-Employer    Page 4-Employee

## 2023-05-01 ENCOUNTER — OCCUPATIONAL MEDICINE (OUTPATIENT)
Dept: URGENT CARE | Facility: PHYSICIAN GROUP | Age: 43
End: 2023-05-01
Payer: OTHER MISCELLANEOUS

## 2023-05-01 VITALS
TEMPERATURE: 98.2 F | HEART RATE: 68 BPM | WEIGHT: 190 LBS | BODY MASS INDEX: 28.79 KG/M2 | HEIGHT: 68 IN | SYSTOLIC BLOOD PRESSURE: 122 MMHG | DIASTOLIC BLOOD PRESSURE: 84 MMHG | OXYGEN SATURATION: 96 % | RESPIRATION RATE: 18 BRPM

## 2023-05-01 DIAGNOSIS — S39.012D STRAIN OF LUMBAR REGION, SUBSEQUENT ENCOUNTER: ICD-10-CM

## 2023-05-01 PROCEDURE — 99214 OFFICE O/P EST MOD 30 MIN: CPT | Performed by: FAMILY MEDICINE

## 2023-05-01 ASSESSMENT — FIBROSIS 4 INDEX: FIB4 SCORE: 1.11

## 2023-05-01 NOTE — LETTER
[facility]  [address] [place] [address]  Phone:  [phone] - Fax:  [phone]   [facility] Progress Report and Disability Certification  Date of Service: 5/1/2023   No Show:  No  Date / Time of Next Visit:     Claim Information   Patient Name: Rogers Cruz  Claim Number:     Employer: [employer]  Date of Injury: 12/15/2022     Insurer / TPA: [facility] Workcomp  ID / SSN:     Occupation:   Diagnosis: The encounter diagnosis was Strain of lumbar region, subsequent encounter.    Medical Information   Related to Industrial Injury? 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  Denies specific traumatic injury or single mechanism of injury.  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  Employee reports initially experiencing lumbar back pain similar 15 2022 which has become more frequent since that time.  Denies numbness tingling or weakness in lower extremity, denies radiation of the pain, denies continence of bowel or bladder.  Initial visit on 4/24/2023 in which prednisone was prescribed as well as cyclobenzaprine.  Employee has been performing full duty with no functional limitations and no specific work limitations were recommended during the visit on 4/24/2023.  Returns today on 5/1/2023 and reports 50% improvement with decreased pain in the lumbar spine at the end of the day and into the work week.  Reports intermittent stiffness when standing from a seated position.  Denies numbness tingling or weakness in the lower extremity.  Physical therapy order has been initiated and is pending.    Objective Findings: Lumbar spine: Full range of motion throughout with forward flexion to 60 degrees, extension to 10 degrees with minimal guarding noted, lateral flexion and rotation intact and full bilaterally, no vertebral midline bony point tenderness, bilateral paravertebral lumbar muscle spasm noted with minimal tenderness, deep tendon reflexes +2 bilaterally and symmetric, motor strength 5 out of 5 throughout the lower extremity, normal gait   Pre-Existing Condition(s):     Assessment:   Condition Improved    Status: Additional Care Required  Permanent Disability:No    Plan: PT  Comments:Occupational medicine consultation    Diagnostics:      Comments:       Disability Information   Status: Released to Full Duty    From:     Through:   Restrictions are:     Physical Restrictions   Sitting:    Standing:    Stooping:    Bending:      Squatting:    Walking:    Climbing:    Pushing:      Pulling:    Other:    Reaching Above Shoulder (L):   Reaching Above Shoulder (R):       Reaching Below Shoulder (L):    Reaching Below Shoulder (R):      Not to exceed Weight Limits   Carrying(hrs):   Weight Limit(lb):   Lifting(hrs):   Weight  Limit(lb):     Comments:      Repetitive Actions   Hands: i.e. Fine Manipulations from Grasping:     Feet: i.e. Operating Foot Controls:     Driving / Operate Machinery:     Health Care Provider’s Original or Electronic Signature  García Devlin M.D. Health Care Provider’s Original or Electronic Signature    Gilbert Mclain DO MPH     Clinic Name / Location: [address] [address] Clinic Phone Number: Dept: [phone]   Appointment Time: 6:45 Pm Visit Start Time: 6:47 PM   Check-In Time:  6:46 Pm Visit Discharge Time:  7:11PM   Original-Treating Physician or Chiropractor    Page 2-Insurer/TPA    Page 3-Employer    Page 4-Employee

## 2023-05-02 NOTE — PROGRESS NOTES
Subjective:   Rogres Cruz is a 42 y.o. 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  Denies specific traumatic injury or single mechanism of injury.  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  Employee reports initially experiencing lumbar back pain similar 15 2022 which has become more frequent since that time.  Denies numbness tingling or weakness in lower extremity, denies radiation of the pain, denies continence of bowel or bladder.  Initial visit on 4/24/2023 in which prednisone was prescribed as well as cyclobenzaprine.  Employee has been performing full duty with no functional limitations and no specific work limitations were recommended during the visit on 4/24/2023.  Returns today on 5/1/2023 and reports 50% improvement with decreased pain in the lumbar spine at the end of the day and into the work week.  Reports intermittent stiffness when standing from a seated position.  Denies numbness tingling or weakness in the lower extremity.  Physical therapy order has been initiated and is pending.   See the D39 form     PMH:  has no past medical history on file.  MEDS:   Current Outpatient Medications:     predniSONE (DELTASONE) 20 MG Tab, Take 2 Tablets by mouth every day for 7 days., Disp: 14 Tablet, Rfl: 0    cyclobenzaprine (FLEXERIL) 5 mg tablet, Take 1 Tablet by mouth at bedtime as needed.  May repeat 1 time. for Muscle Spasms or Moderate Pain for up to 10 days., Disp: 20 Tablet, Rfl: 0  ALLERGIES: No Known Allergies  SURGHX: History reviewed. No pertinent surgical history.  SOCHX:  reports that he has been smoking cigarettes. He does  "not have any smokeless tobacco history on file. He reports current alcohol use. He reports that he does not use drugs.  FH: History reviewed. No pertinent family history.  Review of Systems   All other systems reviewed and are negative.     Objective:   /84   Pulse 68   Temp 36.8 °C (98.2 °F) (Temporal)   Resp 18   Ht 1.727 m (5' 8\")   Wt 86.2 kg (190 lb)   SpO2 96%   BMI 28.89 kg/m²   Physical Exam  Vitals and nursing note reviewed.   Constitutional:       General: He is not in acute distress.     Appearance: He is well-developed.   HENT:      Head: Normocephalic and atraumatic.      Right Ear: External ear normal.      Left Ear: External ear normal.      Nose: Nose normal.      Mouth/Throat:      Mouth: Mucous membranes are moist.   Eyes:      Conjunctiva/sclera: Conjunctivae normal.   Cardiovascular:      Rate and Rhythm: Normal rate.   Pulmonary:      Effort: Pulmonary effort is normal. No respiratory distress.      Breath sounds: Normal breath sounds.   Abdominal:      General: There is no distension.   Musculoskeletal:         General: Normal range of motion.   Skin:     General: Skin is warm and dry.   Neurological:      General: No focal deficit present.      Mental Status: He is alert and oriented to person, place, and time. Mental status is at baseline.      Gait: Gait (gait at baseline) normal.   Psychiatric:         Judgment: Judgment normal.     Lumbar spine: Full range of motion throughout with forward flexion to 60 degrees, extension to 10 degrees with minimal guarding noted, lateral flexion and rotation intact and full bilaterally, no vertebral midline bony point tenderness, bilateral paravertebral lumbar muscle spasm noted with minimal tenderness, deep tendon reflexes +2 bilaterally and symmetric, motor strength 5 out of 5 throughout the lower extremity, normal gait   Assessment/Plan:   1. Strain of lumbar region, subsequent encounter  - Referral to Physical Therapy  - Referral to " Occupational Medicine    See the D-39 form.  Recommend physical therapy, follow-up with occupational medicine in anticipation of prolonged disposition.  Return on 2 weeks if not seen by occupational medicine.      Medical Decision Making/Course:  In the course of preparing for this visit with review of the pertinent past medical history, recent and past clinic visits, current medications, and performing chart, immunization, medical history and medication reconciliation, and in the further course of obtaining the current history pertinent to the clinic visit today, performing an exam and evaluation, ordering and independently evaluating labs, tests  , and/or procedures, prescribing any recommended new medications as noted above, providing any pertinent counseling and education and recommending further coordination of care, at least  26 minutes of total time were spent during this encounter.        Please note that this dictation was created using voice recognition software. I have worked with consultants from the vendor as well as technical experts from [facility] to optimize the interface. I have made every reasonable attempt to correct obvious errors, but I expect that there are errors of grammar and possibly content that I did not discover before finalizing the note.

## 2023-05-24 ENCOUNTER — PHYSICAL THERAPY (OUTPATIENT)
Dept: PHYSICAL THERAPY | Facility: REHABILITATION | Age: 43
End: 2023-05-24
Attending: FAMILY MEDICINE
Payer: OTHER MISCELLANEOUS

## 2023-05-24 DIAGNOSIS — M54.50 CHRONIC RIGHT-SIDED LOW BACK PAIN WITHOUT SCIATICA: ICD-10-CM

## 2023-05-24 DIAGNOSIS — G89.29 CHRONIC RIGHT-SIDED LOW BACK PAIN WITHOUT SCIATICA: ICD-10-CM

## 2023-05-24 DIAGNOSIS — S39.012D LUMBAR SPINE STRAIN, SUBSEQUENT ENCOUNTER: ICD-10-CM

## 2023-05-24 PROCEDURE — 97110 THERAPEUTIC EXERCISES: CPT

## 2023-05-24 PROCEDURE — 97161 PT EVAL LOW COMPLEX 20 MIN: CPT

## 2023-05-24 NOTE — OP THERAPY EVALUATION
Outpatient Physical Therapy  INITIAL EVALUATION    [facility]  [address], [place], [address]  [place] [address]  Phone:  [phone]    Date of Evaluation: 05/24/2023    Patient: Rogers Cruz  YOB: 1980  MRN: [id number]     Referring Provider: García Devlin M.D.  [address]  [address]  [place] [address]   Referring Diagnosis Strain of lumbar region, subsequent encounter [S39.012D]     Time Calculation  Start time: 1115  Stop time: 1200 Time Calculation (min): 45 minutes         Chief Complaint: No chief complaint on file.        Date of onset of impairment: 5/24/2022    Subjective:   History of Present Illness:     Mechanism of injury:  CMHx: Pt indicates he developed some pain in his back off and on. He notes that he has been trying to stretch and fix the problem himself and thinks it is related to the job with all the push/pull/lifting and twisting. He was having some care with a chiro but he notes that things haven't really changed and he notes that the back pain has been 5-6 years he would get adjustments that helped for a couple months. Lately the pain has been better with medicine but things are coming back without the meds. He feels like he leans forward at his waist and doesn't stand right due to the pain.    Pain Behavior  Sxs: deep intermittent middle or to the R side on the gluteal area sharp at times or aching (especially worse end of work week). No other sxs    Aggs: -work; EOD sometimes into next morning if had a harder day; and worse overall at the end of work week  -hard to straighten up if the pain is bad into extension  -once a week carrying 40-70 lbs on a satchel those days are worse    Eas: medicine NSAIDs, ice as well, TENS does help a little or water massage chair,     24 hour: usually worse EOD at work; AM sometimes stiff into extension    Sleep: worse in prone; sometimes can't maybe rarely if things are really bad     Irritability:  mod    Yellow flags: none    Imaging: see imaging sections    PMHx: plantar fasciitis     Profession/Recreation: [employer] 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 4-5x/week TM walk/jog or stair machine or BW exercises to get stretching and stronger    Goals: take pain away                            No past medical history on file.  No past surgical history on file.  Social History     Tobacco Use    Smoking status: Some Days     Types: Cigarettes    Smokeless tobacco: Not on file   Vaping Use    Vaping Use: Never used   Substance Use Topics    Alcohol use: Yes     Comment: occ     Family and Occupational History     Socioeconomic History    Marital status: Single     Spouse name: Not on file    Number of children: Not on file    Years of education: Not on file    Highest education level: Not on file   Occupational History    Not on file       Objective     General Comments     Spine Comments   Standing:  Posture; slightly flat through lumbar spine  Farmer carry; leans away from weight on both sides    AROM  Flexion WFL; with repetitions NC in baseline minor pain no problems (not a lot of lumbar flexion)  Extension WFL; feels worse with repetitions   RSB feels worse in R side  LSB WFL ea way    Supine:  Passive SLR R/L  R WFL 85 deg  L WFL 80 deg    Hip Screen R/L  ALEXIS NT  FADIR/Quadrant WFL, WFL  Flexion WFL (R sided pain*) WFL  IR WFL, WFL  ER WFL, WFL    SIJ Laslett Cluster NT  Compression in S/L  Distraction  POSH/sacrotuberous stress test  Prone sacral P-A WFL no issues    Prone:  Hip PROM not today  Extension  IR  ER    Hip strength R/L  Extension 4+/5, 5/5  Abduction 4+/5 ea    Joint play; familiar pain L4/5/S1 R side worse (has to be in prone on elbows for pain to come on)    Prone Knee Bend (femoral nerve bias)    piriformis/soft tissues; WFL l/s region    Modified kit L/R  L painful with R knee to chest* mild stiff psoas  R mild stiff psoas          Therapeutic Exercises  "(CPT 68032):     1. UPOC 7/22/23 or per work comp auth      Therapeutic Exercise Summary: Home Exercise Program Created and Reviewed with patient    Press ups x10  Supine rotation stretch x45\"    Sl bridges 2x10ea      Therapeutic Treatments and Modalities:     1. Manual Therapy (CPT 24007), RELL ADORNO with pt permission, MDT Ext OP    Time-based treatments/modalities:    Physical Therapy Timed Treatment Charges  Manual therapy minutes (CPT 08551): 10 minutes  Therapeutic exercise minutes (CPT 00750): 10 minutes      Assessment, Response and Plan:   Impairments: impaired functional mobility, lacks appropriate home exercise program and pain with function    Assessment details:  PT finds s/sx consistent with LBP with mobility deficits into extension/R quadrant and SB. This is evident with sensitivity and decreased AROM, pain with P-A pressures, and location of sxs/subjective nature. PT finds contributing factors of decreased hip/trunk coordination/power with decreased hip MMT testing and trunk testing which will be addressed with skilled interventions.    Prognosis: fair    Goals:   Short Term Goals:   -pt meets MCID for RMQ  -pt improves hip extension to 5/5 MMT to improve lifting/carrying for job capacity  -pt does farmer carries without pain and improved mechanics for carrying heavy satchel  -pt with mild pain by end of work day instead of moderate to improve work related activity that doesn't last until next day  -pt with painfree R hip mobility without issues   Short term goal time span:  2-4 weeks      Long Term Goals:    -pt meets MCID for RMQ  -pt does carry satchel on harder work days with mild pain at most  -pt with mild pain by end of work week instead of moderate to improve work related activity  -pt with painfree AROM for IADLs  -pt lifts >50% BW to improve posterior chain strength without pain for work related functions  Long term goal time span:  6-8 weeks    Plan:   Therapy options:  Physical therapy " treatment to continue  Planned therapy interventions:  E Stim Attended (CPT 95397), E Stim Unattended (CPT 59243), Hot or Cold Pack Therapy (CPT 10701), Manual Therapy (CPT 57489), Mechanical Traction (CPT 05042), Neuromuscular Re-education (CPT 52793), Therapeutic Activities (CPT 67712) and Therapeutic Exercise (CPT 22325)  Frequency:  2x week  Duration in weeks:  8  Duration in visits:  16  Discussed with:  Patient      Functional Assessment Used        Referring provider co-signature:  I have reviewed this plan of care and my co-signature certifies the need for services.    Certification Period: 05/24/2023 to  07/26/23    Physician Signature: ________________________________ Date: ______________

## 2023-05-25 ENCOUNTER — OCCUPATIONAL MEDICINE (OUTPATIENT)
Dept: OCCUPATIONAL MEDICINE | Facility: CLINIC | Age: 43
End: 2023-05-25
Payer: OTHER MISCELLANEOUS

## 2023-05-25 VITALS
WEIGHT: 190 LBS | OXYGEN SATURATION: 98 % | SYSTOLIC BLOOD PRESSURE: 122 MMHG | HEIGHT: 68 IN | DIASTOLIC BLOOD PRESSURE: 64 MMHG | BODY MASS INDEX: 28.79 KG/M2 | TEMPERATURE: 96.8 F | HEART RATE: 55 BPM

## 2023-05-25 DIAGNOSIS — S39.012D STRAIN OF LUMBAR REGION, SUBSEQUENT ENCOUNTER: ICD-10-CM

## 2023-05-25 PROCEDURE — 99213 OFFICE O/P EST LOW 20 MIN: CPT | Performed by: NURSE PRACTITIONER

## 2023-05-25 RX ORDER — CYCLOBENZAPRINE HCL 5 MG
10 TABLET ORAL 3 TIMES DAILY PRN
Qty: 30 TABLET | Refills: 0 | Status: SHIPPED | OUTPATIENT
Start: 2023-05-25 | End: 2023-06-14 | Stop reason: SDUPTHER

## 2023-05-25 ASSESSMENT — FIBROSIS 4 INDEX: FIB4 SCORE: 1.11

## 2023-05-25 NOTE — PROGRESS NOTES
"Subjective:     Rogers Cruz is a 42 y.o. male who presents for Other (WC DOI 12/15/23 lower back injury, feeling room)      DOI 12/15/2022: Pt presents for evaluation of a new comp injury. Rogers is a pleasant 42 year old male who works as a . 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 He first noticed low back pain 4 months ago that has progressively become more frequent and is worsening intensity.  The patient states symptoms have improved minimally.  The pain is still there especially at the end of the day symptoms seem to be worse.  He notes that he his pain does not radiate.  He denies leg weakness, bowel or bladder changes, penile/groin pain, or saddle anesthesia.  He is currently taking Advil and using ice which have been the most helpful.  He also has a massage chair which seems to be helping.  He is noted some relief from the oral steroids and the muscle relaxers.  He also has been doing stretching exercises daily.  In the past he has seen a chiropractor but states that this has not been helping lately.  He started physical therapy yesterday states that he think it is going to help but he continues to express concern over the continued discomfort on the right lower back.  He would like to continue full duty.  MRI ordered due to length of injury.  Plan of care discussed with patient.    ROS: All systems were reviewed on intake form, form was reviewed and signed. See scanned documents in media. Pertinent positives and negatives included in HPI.    PMH: No pertinent past medical history to this problem  MEDS: Medications were reviewed in Epic  ALLERGIES: No Known Allergies  SOCHX: Works as  at [employer] for the past 15years  FH: No pertinent family history to this problem       Objective:     /64   Pulse (!) 55   Temp 36 °C (96.8 °F)   Ht 1.727 m (5' 8\")   Wt 86.2 kg (190 lb)   SpO2 98%   BMI 28.89 kg/m² "     [unfilled]    Lumbar: No gross deformity noted.  Mild tenderness to paraspinal musculature L3-S1 and right SI joint.  Neg decreased flexion or rotation.  Discomfort noted with rotation to the right.  Straight leg test positive on the right, negative on the left.  Able to heel/toe walk with minimal difficulty. Cranial nerves grossly intact.  Achilles and patellar reflexes 2+ bilaterally.   Sensation intact. No gait abnormalities       Assessment/Plan:       1. Strain of lumbar region, subsequent encounter  - MR-LUMBAR SPINE-W/O; Future  - Referral to Radiology  - cyclobenzaprine (FLEXERIL) 5 mg tablet; Take 2 Tablets by mouth 3 times a day as needed for Moderate Pain.  Dispense: 30 Tablet; Refill: 0    Released to Full Duty FROM 5/25/2023 TO 6/27/2023     Follow-up in 4 weeks  Full duty  MRI ordered  Physical therapy appointments as scheduled  MRI ordered  Recommend continue with cyclobenzaprine as prescribed do not drive or work while taking this medication due to sedating effects  Recommend continue with OTC Tylenol/ibuprofen, ice application, and OTC topical ointment of your choosing  Recommend continue with gentle range of motion and stretching exercises as tolerated  Strict ED precautions discussed with patient    Differential diagnosis, natural history, supportive care, and indications for immediate follow-up discussed.    Approximately 25 minutes were spent in reviewing notes, preparing for visit, obtaining history, exam and evaluation, patient counseling/education and post visit documentation/orders.

## 2023-05-25 NOTE — LETTER
[address],   [address] [place] [address]  Phone:  [phone] - Fax:  [phone]   Occupational Health [facility] Progress Report and Disability Certification  Date of Service: 5/25/2023   No Show:  No  Date / Time of Next Visit: 6/27/2023 @ 9:00 AM   Claim Information   Patient Name: Rogers Crzu  Claim Number:     Employer:  [employer]  Date of Injury: 12/15/2022     Insurer / TPA: [facility] Workcomp  ID / SSN:     Occupation:   Diagnosis: The encounter diagnosis was Strain of lumbar region, subsequent encounter.    Medical Information   Related to Industrial Injury? Yes    Subjective Complaints:  DOI 12/15/2022: Pt presents for evaluation of a new comp injury. Rogers is a pleasant 42 year old male who works as a . 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 He first noticed low back pain 4 months ago that has progressively become more frequent and is worsening intensity.  The patient states symptoms have improved minimally.  The pain is still there especially at the end of the day symptoms seem to be worse.  He notes that he his pain does not radiate.  He denies leg weakness, bowel or bladder changes, penile/groin pain, or saddle anesthesia.  He is currently taking Advil and using ice which have been the most helpful.  He also has a massage chair which seems to be helping.  He is noted some relief from the oral steroids and the muscle relaxers.  He also has been doing stretching exercises daily.  In the past he has seen a chiropractor but states that this has not been helping lately.  He started physical therapy yesterday states that he think it is going to help but he continues to express concern over the continued discomfort on the right lower back.  He would like to continue full duty.  MRI ordered due to length of injury.  Plan of care discussed with patient.    Objective Findings: Lumbar: No gross deformity noted.  Mild tenderness to paraspinal musculature L3-S1 and right SI joint.  Neg decreased flexion or rotation.  Discomfort noted with rotation to the right.  Straight leg test positive on the right, negative on the left.  Able to heel/toe walk with minimal difficulty. Cranial nerves grossly intact.  Achilles and patellar reflexes 2+ bilaterally.   Sensation intact. No gait abnormalities      Pre-Existing Condition(s):     Assessment:   Condition Same    Status: Additional Care Required  Permanent Disability:No    Plan: PTMedication (NOT at Work)Diagnostics    Diagnostics: MRI    Comments:  Follow-up in 4 weeks  Full duty  MRI ordered  Physical therapy appointments as scheduled  MRI ordered  Recommend continue with cyclobenzaprine as prescribed do not drive or work while taking this medication due to sedating effects  Recommend continue with OTC Tylenol/ibuprofen, ice application, and OTC topical ointment of your choosing  Recommend continue with gentle range of motion and stretching exercises as tolerated  Strict ED precautions discussed with patient    Disability Information   Status: Released to Full Duty    From:  5/25/2023  Through: 6/27/2023 Restrictions are:     Physical Restrictions   Sitting:    Standing:    Stooping:    Bending:      Squatting:    Walking:    Climbing:    Pushing:      Pulling:    Other:    Reaching Above Shoulder (L):   Reaching Above Shoulder (R):       Reaching Below Shoulder (L):    Reaching Below Shoulder (R):      Not to exceed Weight Limits   Carrying(hrs):   Weight Limit(lb):   Lifting(hrs):   Weight  Limit(lb):     Comments:      Repetitive Actions   Hands: i.e. Fine Manipulations from Grasping:     Feet: i.e. Operating Foot Controls:     Driving / Operate Machinery:     Health Care Provider’s Original or Electronic Signature  PABLO Davis Health Care Provider’s Original or Electronic Signature    Gilbert Mclain DO MPH      Clinic Name / Location: [address],   [address]  [place] [address] Clinic Phone Number: Dept: [phone]   Appointment Time: 8:30 Am Visit Start Time: 8:28 AM   Check-In Time:  8:22 Am Visit Discharge Time:  9:03 AM   Original-Treating Physician or Chiropractor    Page 2-Insurer/TPA    Page 3-Employer    Page 4-Employee

## 2023-05-30 ENCOUNTER — PHYSICAL THERAPY (OUTPATIENT)
Dept: PHYSICAL THERAPY | Facility: REHABILITATION | Age: 43
End: 2023-05-30
Attending: FAMILY MEDICINE
Payer: OTHER MISCELLANEOUS

## 2023-05-30 DIAGNOSIS — S39.012D LUMBAR SPINE STRAIN, SUBSEQUENT ENCOUNTER: ICD-10-CM

## 2023-05-30 DIAGNOSIS — G89.29 CHRONIC RIGHT-SIDED LOW BACK PAIN WITHOUT SCIATICA: ICD-10-CM

## 2023-05-30 DIAGNOSIS — M54.50 CHRONIC RIGHT-SIDED LOW BACK PAIN WITHOUT SCIATICA: ICD-10-CM

## 2023-05-30 PROCEDURE — 97140 MANUAL THERAPY 1/> REGIONS: CPT

## 2023-05-30 PROCEDURE — 97110 THERAPEUTIC EXERCISES: CPT

## 2023-05-30 NOTE — OP THERAPY DAILY TREATMENT
"  Outpatient Physical Therapy  DAILY TREATMENT     [facility] Outpatient Physical Therapy [place] [address] [facility], [address]  [place] [address]  Phone:  [phone]    Date: 05/30/2023  Patient: Rogers Cruz  YOB: 1980  MRN: [id number]   Time Calculation  Start time: 0300  Stop time: 0345 Time Calculation (min): 45 minutes   Chief Complaint: No chief complaint on file.  Visit #: 2    SUBJECTIVE:  Pt indicates that he is having a little bit better of back pain R sided/gluteal region. He notes that he had a busy day today so he's in more pain because they had three days off.  Sxs: deep intermittent middle or to the R side on the gluteal area sharp at times or aching (especially worse end of work week). No other sxs     Aggs: -work; EOD sometimes into next morning if had a harder day; and worse overall at the end of work week  -hard to straighten up if the pain is bad into extension  -once a week carrying 40-70 lbs on a satchel those days are worse    OBJECTIVE:  Current objective measures:   Posture; slightly flat through lumbar spine  Farmer carry; leans away from weight on both sides     AROM  Today no sxs; (pt notes took meds today)    Hip PROM Flexion WFL (R sided pain*) WFL    Joint play; familiar pain L4/5/S1 R side worse (has to be in prone on elbows for pain to come on)    Not tested below:  Modified kit L/R  L painful with R knee to chest* mild stiff psoas  R mild stiff psoas          Therapeutic Exercises (CPT 02194):     1. 1. UPOC 7/22/23 or per work comp auth      Therapeutic Exercise Summary:     Therapeutic Exercise Summary: Home Exercise Program Created and Reviewed with patient     Press ups x10  Supine rotation stretch x45\"     Sl bridges 2x10ea    AMRAP (AS many rounds as possible) for twelve minutes today (pt completed three full rounds today at 12 min 23 sec )  2. SL bridges x30\"ea round  1. Nunn carrys 30# x100 feet   3. S/L hip ABD x10 ea round      Therapeutic Treatments and " Modalities:     1. Manual Therapy (CPT 32183), GV HVLAT with pt permission, MDT Ext OP    Physical Therapy Timed Treatment Charges  Manual therapy minutes (CPT 16044): 10 minutes  Therapeutic exercise minutes (CPT 79496): 30 minutes  ASSESSMENT:   Response to treatment: PT finds pt has better AROM tdoay (pt did admit less of a work week prior to today and had some meds on board from MD); still with pain with P-As and R hip flexion that diminishes after manual and self stretching.     PLAN/RECOMMENDATIONS:   Plan for treatment: therapy treatment to continue next visit.  Planned interventions for next visit: continue with current treatment.

## 2023-05-31 ENCOUNTER — HOSPITAL ENCOUNTER (OUTPATIENT)
Dept: RADIOLOGY | Facility: MEDICAL CENTER | Age: 43
End: 2023-05-31
Attending: NURSE PRACTITIONER
Payer: OTHER MISCELLANEOUS

## 2023-05-31 DIAGNOSIS — S39.012D STRAIN OF LUMBAR REGION, SUBSEQUENT ENCOUNTER: ICD-10-CM

## 2023-05-31 PROCEDURE — 72148 MRI LUMBAR SPINE W/O DYE: CPT

## 2023-06-08 NOTE — OP THERAPY DAILY TREATMENT
"  Outpatient Physical Therapy  DAILY TREATMENT     [facility] Outpatient Physical Therapy [place] [id number] [facility], [address]  [place] [address]  Phone:  [phone]    Date: 06/09/2023  Patient: Rogers Cruz  YOB: 1980  MRN: [id number]   Time Calculation  Start time: 0930  Stop time: 1015 Time Calculation (min): 45 minutes   Chief Complaint: No chief complaint on file.  Visit #: 3    SUBJECTIVE: pt notes similar pain patterns. Notes that he is having some days that are better with his new beds he thinks.     Sxs: deep intermittent middle or to the R side on the gluteal area sharp at times or aching (especially worse end of work week). No other sxs     Aggs: -work; EOD sometimes into next morning if had a harder day; and worse overall at the end of work week  -hard to straighten up if the pain is bad into extension  -once a week carrying 40-70 lbs on a satchel those days are worse    OBJECTIVE:   Current objective measures:   Posture; slightly flat through lumbar spine     AROM  Today sxs RSB/ext (better after manual)    None below:    Hip PROM Flexion WFL (R sided pain*) WFL    Joint play; familiar pain L4/5/S1 R side worse (has to be in prone on elbows for pain to come on)    Not tested below:  Modified kit L/R  L painful with R knee to chest* mild stiff psoas  R mild stiff psoas        Therapeutic Exercises (CPT 29010):     1. 1. UPOC 7/22/23 or per work comp auth    2. supine opener/lumbar rot, x1'ea    3. SL bridge, 2x30\"ea    4. S/L hip ABD, PTB 2x15    5. farmer's carry, [id number] 40#    6. Deadlifts 40#KB, 2x10      Therapeutic Exercise Summary:  Therapeutic Exercise Summary: Home Exercise Program Created and Reviewed with patient     Press ups x10  Supine rotation stretch x45\"     Sl bridges 2x10ea    AMRAP (AS many rounds as possible) for twelve minutes today (pt completed three full rounds today at 12 min 23 sec )  2. SL bridges x30\"ea round  1. Nunn carrys 30# x100 feet   3. S/L hip ABD x10 " "ea round      Therapeutic Treatments and Modalities:     1. Manual Therapy (CPT 08412), GV HVLAT with pt permission, GIII P-As lower lumbar R side  Physical Therapy Timed Treatment Charges  Manual therapy minutes (CPT 00183): 15 minutes  Therapeutic exercise minutes (CPT 80317): 25 minutes  ASSESSMENT: PT finds cont AROM pain in compression/quadrant based postures. Needs cues with exercise to avoid this with lifting to not \"overextend\" at top. Able to correct verbally. Cont to address this with proximal strength.    PLAN/RECOMMENDATIONS:   Plan for treatment: therapy treatment to continue next visit.  Planned interventions for next visit: continue with current treatment.         "

## 2023-06-09 ENCOUNTER — PHYSICAL THERAPY (OUTPATIENT)
Dept: PHYSICAL THERAPY | Facility: REHABILITATION | Age: 43
End: 2023-06-09
Attending: FAMILY MEDICINE
Payer: OTHER MISCELLANEOUS

## 2023-06-09 DIAGNOSIS — G89.29 CHRONIC RIGHT-SIDED LOW BACK PAIN WITHOUT SCIATICA: ICD-10-CM

## 2023-06-09 DIAGNOSIS — S39.012D LUMBAR SPINE STRAIN, SUBSEQUENT ENCOUNTER: ICD-10-CM

## 2023-06-09 DIAGNOSIS — M54.50 CHRONIC RIGHT-SIDED LOW BACK PAIN WITHOUT SCIATICA: ICD-10-CM

## 2023-06-09 PROCEDURE — 97140 MANUAL THERAPY 1/> REGIONS: CPT

## 2023-06-09 PROCEDURE — 97110 THERAPEUTIC EXERCISES: CPT

## 2023-06-14 ENCOUNTER — OCCUPATIONAL MEDICINE (OUTPATIENT)
Dept: OCCUPATIONAL MEDICINE | Facility: CLINIC | Age: 43
End: 2023-06-14
Payer: OTHER MISCELLANEOUS

## 2023-06-14 VITALS
OXYGEN SATURATION: 98 % | SYSTOLIC BLOOD PRESSURE: 120 MMHG | DIASTOLIC BLOOD PRESSURE: 84 MMHG | TEMPERATURE: 98.6 F | RESPIRATION RATE: 14 BRPM | HEART RATE: 69 BPM | BODY MASS INDEX: 29.7 KG/M2 | WEIGHT: 196 LBS | HEIGHT: 68 IN

## 2023-06-14 DIAGNOSIS — S39.012D STRAIN OF LUMBAR REGION, SUBSEQUENT ENCOUNTER: ICD-10-CM

## 2023-06-14 PROCEDURE — 3074F SYST BP LT 130 MM HG: CPT | Performed by: NURSE PRACTITIONER

## 2023-06-14 PROCEDURE — 99213 OFFICE O/P EST LOW 20 MIN: CPT | Performed by: NURSE PRACTITIONER

## 2023-06-14 PROCEDURE — 1125F AMNT PAIN NOTED PAIN PRSNT: CPT | Performed by: NURSE PRACTITIONER

## 2023-06-14 PROCEDURE — 3079F DIAST BP 80-89 MM HG: CPT | Performed by: NURSE PRACTITIONER

## 2023-06-14 RX ORDER — CYCLOBENZAPRINE HCL 5 MG
10 TABLET ORAL 3 TIMES DAILY PRN
Qty: 30 TABLET | Refills: 0 | Status: SHIPPED | OUTPATIENT
Start: 2023-06-14 | End: 2023-06-14 | Stop reason: SDUPTHER

## 2023-06-14 RX ORDER — CYCLOBENZAPRINE HCL 5 MG
10 TABLET ORAL 3 TIMES DAILY PRN
Qty: 30 TABLET | Refills: 0 | Status: SHIPPED | OUTPATIENT
Start: 2023-06-14

## 2023-06-14 ASSESSMENT — ENCOUNTER SYMPTOMS
PSYCHIATRIC NEGATIVE: 1
CARDIOVASCULAR NEGATIVE: 1
TINGLING: 0
WEAKNESS: 0
CONSTITUTIONAL NEGATIVE: 1
RESPIRATORY NEGATIVE: 1
SENSORY CHANGE: 0
MYALGIAS: 1
BACK PAIN: 1

## 2023-06-14 ASSESSMENT — PAIN SCALES - GENERAL: PAINLEVEL: 6=MODERATE PAIN

## 2023-06-14 ASSESSMENT — FIBROSIS 4 INDEX: FIB4 SCORE: 1.11

## 2023-06-14 NOTE — PROGRESS NOTES
"Subjective:     Rogers Cruz is a 42 y.o. male who presents for Follow-Up (WC DOI 12/15/23 lower back injury -)      DOI 12/15/2022: Pt presents for evaluation of a new comp injury. Rogers is a pleasant 42 year old male who works as a . 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 He first noticed low back pain 4 months ago that has progressively become more frequent and is worsening intensity.  The patient symptoms are usually little worse after carrying millimeters otherwise symptoms are manageable.  Continues to endorse pain at the end of the day and the pain does not radiate .  He denies leg weakness, bowel or bladder changes, penile/groin pain, or saddle anesthesia.  He is currently taking Advil and using ice which have been the most helpful.  He also has a massage chair which seems to be helping.  He also has been doing stretching exercises daily.  He feels physical therapy has been very helpful.  He would like additional sessions which have been ordered at this visit.  Continue full duty.  MRI was reviewed with patient.  Surgery referral placed for further evaluation management given MRI findings.  Plan of care discussed with patient.    Review of Systems   Constitutional: Negative.    Respiratory: Negative.     Cardiovascular: Negative.    Musculoskeletal:  Positive for back pain and myalgias.   Skin: Negative.    Neurological:  Negative for tingling, sensory change and weakness.   Psychiatric/Behavioral: Negative.         SOCHX: Works as a  at [employer]  FH: No pertinent family history to this problem.       Objective:     /84   Pulse 69   Temp 37 °C (98.6 °F) (Temporal)   Resp 14   Ht 1.727 m (5' 8\")   Wt 88.9 kg (196 lb)   SpO2 98%   BMI 29.80 kg/m²     Constitutional: Patient is in no acute distress. Appears well-developed and well-nourished.   Cardiovascular: Normal rate.    Pulmonary/Chest: Effort " normal. No respiratory distress.   Neurological: Patient is alert and oriented to person, place, and time.   Skin: Skin is warm and dry.   Psychiatric: Normal mood and affect. Behavior is normal.     Lumbar: No gross deformity noted.  Mild tenderness to paraspinal musculature L3-S1 and right SI joint.  Neg decreased flexion or rotation.  Discomfort noted with rotation to the right.  Straight leg test positive on the right, negative on the left.  Able to heel/toe walk with minimal difficulty. Cranial nerves grossly intact.  Achilles and patellar reflexes 2+ bilaterally.   Sensation intact. No gait abnormalities       Lumbar 5/31/23:  IMPRESSION:     1.  Annular disc bulge with a small central disc protrusion and tear of the annulus fibrosis with mild bilateral ligamentum flavum hypertrophy at L4-5. No central canal narrowing. There is mild bilateral neural foraminal narrowing.     2.  Annular disc bulge with a small left paracentral disc protrusion and tear of the annulus fibrosis with mild bilateral ligamentum flavum hypertrophy at L5-S1. No central canal narrowing. There is mild bilateral neuroforaminal narrowing.    Assessment/Plan:       1. Strain of lumbar region, subsequent encounter  - Referral to Pain Clinic  - Referral to Physical Therapy  - cyclobenzaprine (FLEXERIL) 5 mg tablet; Take 2 Tablets by mouth 3 times a day as needed for Moderate Pain.  Dispense: 30 Tablet; Refill: 0    Released to Full Duty FROM 6/14/2023 TO 7/13/2023       Follow-up in 4 weeks, per physiatry  Full duty, per physiatry  Physiatry referral placed, transfer care  Physical therapy appointments as scheduled, additional sessions requested  Recommend continue with cyclobenzaprine as prescribed do not drive or work while taking this medication due to sedating effects  Recommend continue with OTC Tylenol/ibuprofen, ice application, and OTC topical ointment of your choosing  Recommend continue with gentle range of motion and stretching  exercises as tolerated  Strict ED precautions discussed with patient    Differential diagnosis, natural history, supportive care, and indications for immediate follow-up discussed.    Approximately 25 minutes was spent in preparing for visit, obtaining history, exam and evaluation, patient counseling/education and post visit documentation/orders.

## 2023-06-14 NOTE — LETTER
[address],   [address] [place] [address]  Phone:  [phone] - Fax:  [phone]   Occupational Health [facility] Progress Report and Disability Certification  Date of Service: 6/14/2023   No Show:  No  Date / Time of Next Visit: 7/13/2023 @ 8:30 am   Claim Information   Patient Name: Rogers Cruz  Claim Number:     Employer:  [employer]  Date of Injury: 12/15/2022     Insurer / TPA: [facility]  ID / SSN:     Occupation:   Diagnosis: The encounter diagnosis was Strain of lumbar region, subsequent encounter.    Medical Information   Related to Industrial Injury? Yes    Subjective Complaints:  DOI 12/15/2022: Pt presents for evaluation of a new comp injury. Rogers is a pleasant 42 year old male who works as a . 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 He first noticed low back pain 4 months ago that has progressively become more frequent and is worsening intensity.  The patient symptoms are usually little worse after carrying millimeters otherwise symptoms are manageable.  Continues to endorse pain at the end of the day and the pain does not radiate .  He denies leg weakness, bowel or bladder changes, penile/groin pain, or saddle anesthesia.  He is currently taking Advil and using ice which have been the most helpful.  He also has a massage chair which seems to be helping.  He also has been doing stretching exercises daily.  He feels physical therapy has been very helpful.  He would like additional sessions which have been ordered at this visit.  Continue full duty.  MRI was reviewed with patient.  Surgery referral placed for further evaluation management given MRI findings.  Plan of care discussed with patient.   Objective Findings: Lumbar: No gross deformity noted.  Mild tenderness to paraspinal musculature L3-S1 and right SI joint.  Neg decreased flexion  or rotation.  Discomfort noted with rotation to the right.  Straight leg test positive on the right, negative on the left.  Able to heel/toe walk with minimal difficulty. Cranial nerves grossly intact.  Achilles and patellar reflexes 2+ bilaterally.   Sensation intact. No gait abnormalities       Lumbar 5/31/23:  IMPRESSION:     1.  Annular disc bulge with a small central disc protrusion and tear of the annulus fibrosis with mild bilateral ligamentum flavum hypertrophy at L4-5. No central canal narrowing. There is mild bilateral neural foraminal narrowing.     2.  Annular disc bulge with a small left paracentral disc protrusion and tear of the annulus fibrosis with mild bilateral ligamentum flavum hypertrophy at L5-S1. No central canal narrowing. There is mild bilateral neuroforaminal narrowing.   Pre-Existing Condition(s):     Assessment:   Condition Same    Status: Discharged / Care Transfer  Permanent Disability:No    Plan: PTTransfer CareMedication (NOT at Work)    Diagnostics:      Comments:  Follow-up in 4 weeks, per physiatry  Full duty, per physiatry  Physiatry referral placed, transfer care  Physical therapy appointments as scheduled, additional sessions requested  Recommend continue with cyclobenzaprine as prescribed do not drive or work while taking this medication due to sedating effects  Recommend continue with OTC Tylenol/ibuprofen, ice application, and OTC topical ointment of your choosing  Recommend continue with gentle range of motion and stretching exercises as tolerated  Strict ED precautions discussed with patient    Disability Information   Status: Released to Full Duty    From:  6/14/2023  Through: 7/13/2023 Restrictions are:     Physical Restrictions   Sitting:    Standing:    Stooping:    Bending:      Squatting:    Walking:    Climbing:    Pushing:      Pulling:    Other:    Reaching Above Shoulder (L):   Reaching Above Shoulder (R):       Reaching Below Shoulder (L):    Reaching Below  Shoulder (R):      Not to exceed Weight Limits   Carrying(hrs):   Weight Limit(lb):   Lifting(hrs):   Weight  Limit(lb):     Comments:      Repetitive Actions   Hands: i.e. Fine Manipulations from Grasping:     Feet: i.e. Operating Foot Controls:     Driving / Operate Machinery:     Health Care Provider’s Original or Electronic Signature  PABLO Davis Health Care Provider’s Original or Electronic Signature    Gilbert Mclain DO MPH     Clinic Name / Location: [address],   [address] [place] [address] Clinic Phone Number: Dept: [phone]   Appointment Time: 8:30 Am Visit Start Time: 8:32 AM   Check-In Time:  8:31 Am Visit Discharge Time:  9:09 am   Original-Treating Physician or Chiropractor    Page 2-Insurer/TPA    Page 3-Employer    Page 4-Employee

## 2023-06-16 NOTE — OP THERAPY DAILY TREATMENT
"  Outpatient Physical Therapy  DAILY TREATMENT     [facility] Outpatient Physical Therapy [place] [address] [facility], [address]  [place] [address]  Phone:  [phone]    Date: 06/20/2023  Patient: Rogers Cruz  YOB: 1980  MRN: [id number]   Time Calculation  Start time: 0730  Stop time: 0808 Time Calculation (min): 38 minutes   Chief Complaint: No chief complaint on file.  Visit #: 4    SUBJECTIVE: Pt indicates that he is still having pain; is maybe overall feeling slightly better. He notes the MD is noting he has a bulged disc may need surgery.     Sxs: deep intermittent middle or to the R side on the gluteal area sharp at times or aching (especially worse end of work week). No other sxs     Aggs: -work; EOD sometimes into next morning if had a harder day; and worse overall at the end of work week  -hard to straighten up if the pain is bad into extension  -once a week carrying 40-70 lbs on a satchel those days are worse    OBJECTIVE:   Current objective measures:   Posture; slightly flat through lumbar spine     AROM  Today sxs RSB/ext no sxs    None below:    Hip PROM Flexion WFL (R sided pain*) WFL    Joint play; no pain today    R side 43 seconds, L 1 min     Modified kit L/R  non painful with R knee to chest* mild stiff psoas  R mild stiff psoas        Therapeutic Exercises (CPT 07082):     1. 1. UPOC 7/22/23 or per work comp auth    2. supine opener/lumbar rot, x1'ea    3. SL bridge, 2x30\"ea    4. S/L hip ABD, PTB 2x15    5. farmer's carry, [id number] 40#    6. Deadlifts 40#KB, 2x10    7. chops, kneeling foam bigPTB 2x20      Therapeutic Exercise Summary:  Therapeutic Exercise Summary: Home Exercise Program Created and Reviewed with patient     Press ups x10  Supine rotation stretch x45\"     Sl bridges 2x10ea    AMRAP (AS many rounds as possible) for twelve minutes today (pt completed three full rounds today at 12 min 23 sec )  2. SL bridges x30\"ea round  1. Nunn carrys 30# x100 feet   3. S/L hip " ABD x10 ea round      Therapeutic Treatments and Modalities:     1. Manual Therapy (CPT 83489), GV HVLAT with pt permission, GIII P-As lower lumbar R side  Physical Therapy Timed Treatment Charges  Therapeutic exercise minutes (CPT 55297): 38 minutes  ASSESSMENT: Pt with cont proximal strength R>L deficits. Is progressing with strength functionally with weights without issues. No pain with AROM but having a good morning today vs last two visits.    PLAN/RECOMMENDATIONS:   Plan for treatment: therapy treatment to continue next visit.  Planned interventions for next visit: continue with current treatment.

## 2023-06-20 ENCOUNTER — PHYSICAL THERAPY (OUTPATIENT)
Dept: PHYSICAL THERAPY | Facility: REHABILITATION | Age: 43
End: 2023-06-20
Attending: FAMILY MEDICINE
Payer: OTHER MISCELLANEOUS

## 2023-06-20 DIAGNOSIS — G89.29 CHRONIC RIGHT-SIDED LOW BACK PAIN WITHOUT SCIATICA: ICD-10-CM

## 2023-06-20 DIAGNOSIS — M54.50 CHRONIC RIGHT-SIDED LOW BACK PAIN WITHOUT SCIATICA: ICD-10-CM

## 2023-06-20 DIAGNOSIS — S39.012D LUMBAR SPINE STRAIN, SUBSEQUENT ENCOUNTER: ICD-10-CM

## 2023-06-20 PROCEDURE — 97110 THERAPEUTIC EXERCISES: CPT

## 2023-06-26 ENCOUNTER — APPOINTMENT (OUTPATIENT)
Dept: PHYSICAL THERAPY | Facility: REHABILITATION | Age: 43
End: 2023-06-26
Attending: FAMILY MEDICINE
Payer: OTHER MISCELLANEOUS

## 2023-06-29 NOTE — OP THERAPY DAILY TREATMENT
"  Outpatient Physical Therapy  DAILY TREATMENT     [facility] Outpatient Physical Therapy [place]  [address] [facility], [address]  [place] [address]  Phone:  [phone]    Date: 06/30/2023  Patient: Rogers Cruz  YOB: 1980  MRN: [id number]   Time Calculation  Start time: 0845  Stop time: 0930 Time Calculation (min): 45 minutes   Chief Complaint: No chief complaint on file.  Visit #: 5    SUBJECTIVE: pt notes that he hasn't had as much pain but still feels some bad days or days with his satchel.     Sxs: deep intermittent middle or to the R side on the gluteal area sharp at times or aching (especially worse end of work week). No other sxs     Aggs: -work; EOD sometimes into next morning if had a harder day; and worse overall at the end of work week  -hard to straighten up if the pain is bad into extension  -once a week carrying 40-70 lbs on a satchel those days are worse    OBJECTIVE:   Current objective measures:   AROM  Today sxs RSB/ext no sxs  Posture; slightly flat through lumbar spine    None below:    Hip PROM Flexion WFL (R sided pain*) WFL    Joint play; no pain today    R side 43 seconds, L 1 min     Modified kit L/R  non painful with R knee to chest* mild stiff psoas  R mild stiff psoas        Therapeutic Exercises (CPT 04574):     1. 1. UPOC 7/22/23 or per work comp auth    2. supine opener/lumbar rot, 2x1'ea    3. SL bridge, 2x30\"ea    4. S/L hip ABD, PTB 2x15; skipped but part of HEP, with side plank 2x20\"ea side    5. farmer's carry, [id number] 40#, with march today    6. Deadlifts, 75#BB x8, 95#BB 2x10    7. chops, kneeling foam bigOTB x10, standing mini lunge x10ea    8. bird dog row, 20# 2x10ea      Therapeutic Exercise Summary:  Therapeutic Exercise Summary: Home Exercise Program Created and Reviewed with patient     Press ups x10  Supine rotation stretch x45\"     Sl bridges 2x10ea    AMRAP (AS many rounds as possible) for twelve minutes today (pt completed three full rounds today at 12 min " "23 sec )  2. SL bridges x30\"ea round  1. Nunn carrys 30# x100 feet   3. S/L hip ABD x10 ea round      Therapeutic Treatments and Modalities:     1. Manual Therapy (CPT 23138), GV HVLAT with pt permission, GIII P-As lower lumbar R side  Physical Therapy Timed Treatment Charges  Manual therapy minutes (CPT 89794): 15 minutes  Therapeutic exercise minutes (CPT 35195): 30 minutes  ASSESSMENT: Pt with improving strength; no pain during session today after w/u other than baseline R sided pain coming into the clinic.     PLAN/RECOMMENDATIONS:   Plan for treatment: therapy treatment to continue next visit.  Planned interventions for next visit: continue with current treatment.         "

## 2023-06-30 ENCOUNTER — PHYSICAL THERAPY (OUTPATIENT)
Dept: PHYSICAL THERAPY | Facility: REHABILITATION | Age: 43
End: 2023-06-30
Attending: FAMILY MEDICINE
Payer: OTHER MISCELLANEOUS

## 2023-06-30 DIAGNOSIS — G89.29 CHRONIC RIGHT-SIDED LOW BACK PAIN WITHOUT SCIATICA: ICD-10-CM

## 2023-06-30 DIAGNOSIS — S39.012D LUMBAR SPINE STRAIN, SUBSEQUENT ENCOUNTER: ICD-10-CM

## 2023-06-30 DIAGNOSIS — M54.50 CHRONIC RIGHT-SIDED LOW BACK PAIN WITHOUT SCIATICA: ICD-10-CM

## 2023-06-30 PROCEDURE — 97140 MANUAL THERAPY 1/> REGIONS: CPT

## 2023-06-30 PROCEDURE — 97110 THERAPEUTIC EXERCISES: CPT

## 2023-07-03 NOTE — OP THERAPY DAILY TREATMENT
"  Outpatient Physical Therapy  DAILY TREATMENT     [facility] Outpatient Physical Therapy [place] [address] [facility] [address], [address]  [place] [address]  Phone:  [phone]    Date: 07/05/2023  Patient: Rogers Cruz  YOB: 1980  MRN: [id number]   Time Calculation  Start time: 0810  Stop time: 0900 Time Calculation (min): 50 minutes   Chief Complaint: No chief complaint on file.  Visit #: 6    SUBJECTIVE: Notes that while he didn't have pain during his PT session felt a lot more pain a couple days after and had to leave work early. He indicates that he had some issues for a few days with his episodic pain where it is hard to extend in AM. More pain this AM but can extend straight.     Sxs: deep intermittent middle or to the R side on the gluteal area sharp at times or aching (especially worse end of work week). No other sxs     Aggs: -work; EOD sometimes into next morning if had a harder day; and worse overall at the end of work week  -hard to straighten up if the pain is bad into extension  -once a week carrying 40-70 lbs on a satchel those days are worse    OBJECTIVE:   Current objective measures:   AROM  Today sxs LSB at sacrum  Posture; slightly flat through lumbar spine    Hip PROM Flexion WFL (R sided pain*)   WFLSacral stress, P-A, compression Wfl today    None below:  Joint play; no pain today      R side 43 seconds, L 1 min     Modified kit L/R  non painful with R knee to chest* mild stiff psoas  R mild stiff psoas        Therapeutic Exercises (CPT 29274):     1. 1. UPOC 7/22/23 or per work comp auth    2. supine opener/lumbar rot, 2x1'ea    3. SL bridge, 2x30\"ea    4. S/L hip ABD, PTB 2x15; skipped but part of HEP, with side plank 2x20\"ea side    5. farmer's carry, [id number] 40#, with march today    6. Deadlifts, 75#BB x8, 95#BB 2x10, not today    7. chops, kneeling foam bigOTB x10, standing mini lunge x10ea, not today    8. bird dog row, 20# 2x10ea, not today    10. DL bridges, 4x10, with hip ABD/ADD   " " 11. tere, PTB, x30ea      Therapeutic Exercise Summary:  Therapeutic Exercise Summary: Home Exercise Program Created and Reviewed with patient     Press ups x10  Supine rotation stretch x45\"     Sl bridges 2x10ea    AMRAP (AS many rounds as possible) for twelve minutes today (pt completed three full rounds today at 12 min 23 sec )  2. SL bridges x30\"ea round  1. Unnn carrys 30# x100 feet   3. S/L hip ABD x10 ea round      Therapeutic Treatments and Modalities:     1. Manual Therapy (CPT 93701), GV HVLAT with pt permission, GIII P-As lower lumbar R side, STW paraspinals, long axis traction R hip, NC to pain    2. E Stim Unattended (CPT 10114), x10' with IFC and P; bell for safety of pt/comfort, to dec pain short term  Physical Therapy Timed Treatment Charges  Manual therapy minutes (CPT 21226): 25 minutes  Therapeutic exercise minutes (CPT 31926): 15 minutes  ASSESSMENT: Pt with regression with pain with AROM of L/S and R hip with flexion. NC after lumbar Tx or hip Tx today. Attempt to regress a bit prior to adding heavier load in again with HEP/in clinic.     PLAN/RECOMMENDATIONS:   Plan for treatment: therapy treatment to continue next visit.  Planned interventions for next visit: continue with current treatment.         "

## 2023-07-05 ENCOUNTER — PHYSICAL THERAPY (OUTPATIENT)
Dept: PHYSICAL THERAPY | Facility: REHABILITATION | Age: 43
End: 2023-07-05
Attending: FAMILY MEDICINE
Payer: OTHER MISCELLANEOUS

## 2023-07-05 DIAGNOSIS — G89.29 CHRONIC RIGHT-SIDED LOW BACK PAIN WITHOUT SCIATICA: ICD-10-CM

## 2023-07-05 DIAGNOSIS — S39.012D LUMBAR SPINE STRAIN, SUBSEQUENT ENCOUNTER: ICD-10-CM

## 2023-07-05 DIAGNOSIS — M54.50 CHRONIC RIGHT-SIDED LOW BACK PAIN WITHOUT SCIATICA: ICD-10-CM

## 2023-07-05 PROCEDURE — 97140 MANUAL THERAPY 1/> REGIONS: CPT

## 2023-07-05 PROCEDURE — 97110 THERAPEUTIC EXERCISES: CPT

## 2023-07-05 PROCEDURE — 97014 ELECTRIC STIMULATION THERAPY: CPT

## 2023-07-05 NOTE — OP THERAPY PROGRESS SUMMARY
Outpatient Physical Therapy  PROGRESS SUMMARY NOTE      [facility] [place] [address] [facility], [address]  [place] [address]  Phone:  [phone]    Date of Visit: 07/05/2023    Patient: Rogers Cruz  YOB: 1980  MRN: [id number]     Referring Provider: García Devlin M.D.  [address]  [address]  [place] [address]   Referring Diagnosis No admission diagnoses are documented for this encounter.     Visit Diagnoses     ICD-10-CM   1. Chronic right-sided low back pain without sciatica  M54.50    G89.29   2. Lumbar spine strain, subsequent encounter  S39.012D       Rehab Potential: fair    Progress Report Period: 5/24/23-7/5/23    Functional Assessment Used          Objective Findings and Assessment:   Patient progression towards goals: Pt has been seen for 6 visits. He has made progress except for a setback a few days ago where he was moving with nearly painfree AROM, improving proximal strength. 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 He can benefit from cont skilled PT care to improve his overall functional strength for work related demands.     Objective findings and assessment details: See daily note 7/5/23    Goals:   Short Term Goals:   -pt meets MCID for RMQ (NOT MET)  -pt improves hip extension to 5/5 MMT to improve lifting/carrying for job capacity (4+/5 now)  -pt does farmer carries without pain and improved mechanics for carrying heavy satchel (MET)  -pt with mild pain by end of work day instead of moderate to improve work related activity that doesn't last until next day (50% MET)  -pt with painfree R hip mobility without issues  (MET UNTIL FLARE UP 3 days ago)        Short term goal time span:  2-4 weeks      Long Term Goals:        -pt meets MCID for RMQ  -pt does carry satchel on harder work days with mild pain at  most  -pt with mild pain by end of work week instead of moderate to improve work related activity  -pt with painfree AROM for IADLs  -pt lifts >50% BW to improve posterior chain strength without pain for work related functions    Long term goal time span:  6-8 weeks    Plan:   Planned therapy interventions:  E Stim Attended (CPT 74696), E Stim Unattended (CPT 72417), Hot or Cold Pack Therapy (CPT 33014), Manual Therapy (CPT 05234), Mechanical Traction (CPT 05133), Neuromuscular Re-education (CPT 42113), Therapeutic Activities (CPT 73259) and Therapeutic Exercise (CPT 20363)  Frequency:  1x week  Duration in weeks:  8  Duration in visits:  8      Referring provider co-signature:  I have reviewed this plan of care and my co-signature certifies the need for services.     Certification Period: 07/05/2023 to 09/06/23    Physician Signature: ________________________________ Date: ______________

## 2023-07-11 ENCOUNTER — APPOINTMENT (OUTPATIENT)
Dept: PHYSICAL THERAPY | Facility: REHABILITATION | Age: 43
End: 2023-07-11
Attending: FAMILY MEDICINE
Payer: OTHER MISCELLANEOUS

## 2023-07-18 ENCOUNTER — APPOINTMENT (OUTPATIENT)
Dept: PHYSICAL THERAPY | Facility: REHABILITATION | Age: 43
End: 2023-07-18
Payer: OTHER MISCELLANEOUS

## 2023-08-15 ENCOUNTER — TELEPHONE (OUTPATIENT)
Dept: PHYSICAL THERAPY | Facility: REHABILITATION | Age: 43
End: 2023-08-15
Payer: OTHER MISCELLANEOUS

## 2023-08-15 NOTE — OP THERAPY DISCHARGE SUMMARY
Outpatient Physical Therapy  DISCHARGE SUMMARY NOTE      [facility] [address], [address]  [place] [address]  Phone:  [phone]    Date of Visit: 08/15/2023    Patient: Rogers Cruz  YOB: 1980  MRN: [id number]     Referring Provider: Rinku Rock MD   Referring Diagnosis No admission diagnoses are documented for this encounter.         Functional Assessment Used        Your patient is being discharged from Physical Therapy with the following comments:   Patient has failed to schedule or reschedule follow-up visits    Comments:  Patient has failed to schedule follow up visits; no contact since last visit and lapse in care >30 days at this time. Due to company policy patient will be discharged and in need of a new referral should skilled PT care be needed. Recommend follow up with PCP/work comp MD for ongoing issues.         Jarett Li, PT    Date: 8/15/2023